# Patient Record
Sex: FEMALE | Race: WHITE | HISPANIC OR LATINO | Employment: OTHER | URBAN - METROPOLITAN AREA
[De-identification: names, ages, dates, MRNs, and addresses within clinical notes are randomized per-mention and may not be internally consistent; named-entity substitution may affect disease eponyms.]

---

## 2020-12-21 ENCOUNTER — OFFICE VISIT (OUTPATIENT)
Dept: OBSTETRICS AND GYNECOLOGY | Facility: CLINIC | Age: 65
End: 2020-12-21
Payer: MEDICARE

## 2020-12-21 VITALS
WEIGHT: 121 LBS | DIASTOLIC BLOOD PRESSURE: 78 MMHG | HEIGHT: 61 IN | SYSTOLIC BLOOD PRESSURE: 123 MMHG | BODY MASS INDEX: 22.84 KG/M2

## 2020-12-21 DIAGNOSIS — N95.2 ATROPHIC VAGINITIS: ICD-10-CM

## 2020-12-21 DIAGNOSIS — Z00.00 ENCOUNTER FOR WELLNESS EXAMINATION: Primary | ICD-10-CM

## 2020-12-21 PROCEDURE — G0101 CA SCREEN;PELVIC/BREAST EXAM: HCPCS | Mod: S$GLB,,, | Performed by: OBSTETRICS & GYNECOLOGY

## 2020-12-21 PROCEDURE — G0101 PR CA SCREEN;PELVIC/BREAST EXAM: ICD-10-PCS | Mod: S$GLB,,, | Performed by: OBSTETRICS & GYNECOLOGY

## 2020-12-21 RX ORDER — CYCLOSPORINE 0.5 MG/ML
EMULSION OPHTHALMIC
COMMUNITY
Start: 2020-12-11 | End: 2021-12-28

## 2020-12-21 RX ORDER — POLYETHYLENE GLYCOL 3350 17 G/17G
17 POWDER, FOR SOLUTION ORAL
COMMUNITY
End: 2021-12-28

## 2020-12-21 RX ORDER — TRAZODONE HYDROCHLORIDE 50 MG/1
50 TABLET ORAL NIGHTLY
COMMUNITY
Start: 2020-12-02

## 2020-12-21 RX ORDER — LEVOTHYROXINE SODIUM 25 UG/1
25 TABLET ORAL
COMMUNITY
End: 2021-12-28

## 2020-12-21 RX ORDER — CALCIUM CARBONATE 600 MG
1 TABLET ORAL 2 TIMES DAILY WITH MEALS
COMMUNITY

## 2020-12-21 RX ORDER — ROSUVASTATIN CALCIUM 10 MG/1
5 TABLET, COATED ORAL
COMMUNITY
End: 2023-01-31

## 2020-12-21 RX ORDER — AA/PROT/LYSINE/METHIO/VIT C/B6 50-12.5 MG
10 TABLET ORAL DAILY
COMMUNITY

## 2020-12-21 RX ORDER — ESCITALOPRAM OXALATE 5 MG/1
TABLET ORAL
COMMUNITY
Start: 2020-10-30 | End: 2021-12-28

## 2020-12-21 NOTE — PROGRESS NOTES
Subjective:       Patient ID: Christiane Harry is a 65 y.o. female.    Chief Complaint:  Well Woman      History of Present Illness  HPI  Annual Exam-Postmenopausal  Patient presents for annual exam. The patient has no complaints today. The patient is sexually active. GYN screening history: last pap: was normal. The patient is not taking hormone replacement therapy. Patient denies post-menopausal vaginal bleeding.       Aloe and e working for her vaginal dryness          GYN & OB History  No LMP recorded.   Date of Last Pap: No result found    OB History    Para Term  AB Living   2 1 1   1     SAB TAB Ectopic Multiple Live Births   1              # Outcome Date GA Lbr Danis/2nd Weight Sex Delivery Anes PTL Lv   2 SAB            1 Term      Vag-Spont          Review of Systems  Review of Systems   Constitutional: Negative.  Negative for activity change, appetite change, chills, fatigue, fever and unexpected weight change.   HENT: Negative for nasal congestion.    Eyes: Negative for visual disturbance.   Respiratory: Negative for cough, shortness of breath and wheezing.    Cardiovascular: Negative for chest pain, palpitations and leg swelling.   Gastrointestinal: Negative.  Negative for abdominal pain, bloating, blood in stool, constipation, diarrhea and reflux.   Endocrine: Negative.  Negative for diabetes, hair loss, hot flashes, hyperthyroidism and hypothyroidism.   Genitourinary: Negative.  Positive for vaginal dryness. Negative for bladder incontinence, decreased libido, dysmenorrhea, dyspareunia, dysuria, flank pain, frequency, genital sores, hematuria, hot flashes, menorrhagia, menstrual problem, pelvic pain, urgency, vaginal bleeding, vaginal discharge, vaginal pain, urinary incontinence, postcoital bleeding, postmenopausal bleeding and vaginal odor.   Musculoskeletal: Negative for back pain, joint swelling and myalgias.   Integumentary:  Negative for rash, acne, hair changes, mole/lesion,  breast mass, nipple discharge, breast skin changes and breast tenderness. Negative.   Neurological: Negative.  Negative for vertigo, seizures, syncope, numbness and headaches.   Hematological: Negative.  Negative for adenopathy. Does not bruise/bleed easily.   Psychiatric/Behavioral: Negative.  Negative for depression and sleep disturbance. The patient is not nervous/anxious.    Breast: negative.  Negative for asymmetry, breast self exam, lump, mass, mastodynia, nipple discharge, skin changes and tenderness          Objective:    Physical Exam:   Constitutional: She appears well-developed and well-nourished.    HENT:   Nose: No epistaxis.     Neck: No thyroid mass and no thyromegaly present.    Cardiovascular: Normal rate, regular rhythm and normal pulses.     Pulmonary/Chest: Effort normal and breath sounds normal. Chest wall is not dull to percussion. She exhibits no mass, no tenderness, no bony tenderness, no laceration, no crepitus, no edema, no deformity, no swelling and no retraction. Right breast exhibits no inverted nipple, no mass, no nipple discharge, no skin change, no tenderness, presence, no bleeding and no swelling. Left breast exhibits no inverted nipple, no mass, no nipple discharge, no skin change, no tenderness, presence, no bleeding and no swelling.        Abdominal: Soft. Normal appearance and bowel sounds are normal. There is no abdominal tenderness. Hernia confirmed negative in the right inguinal area and confirmed negative in the left inguinal area.     Genitourinary:    Vagina and uterus normal.      Pelvic exam was performed with patient supine.   There is no rash, tenderness, lesion or injury on the right labia. There is no rash, tenderness, lesion or injury on the left labia. Cervix is normal. Right adnexum displays no mass, no tenderness and no fullness. Left adnexum displays no mass, no tenderness and no fullness. No rectocele, cystocele or unspecified prolapse of vaginal walls in the  vagina. Labial bartholins normal.   Genitourinary Comments: Atrophic vaginitis                   Skin: Skin is warm and dry.    Psychiatric: She has a normal mood and affect. Her speech is normal and behavior is normal.          Assessment:        1. Encounter for wellness examination    2. Atrophic vaginitis       Encounter for wellness examination    Atrophic vaginitis             Plan:      Follow up in about 1 year (around 12/21/2021).

## 2021-11-16 ENCOUNTER — TELEPHONE (OUTPATIENT)
Dept: OBSTETRICS AND GYNECOLOGY | Facility: CLINIC | Age: 66
End: 2021-11-16
Payer: MEDICARE

## 2021-11-17 ENCOUNTER — TELEPHONE (OUTPATIENT)
Dept: OBSTETRICS AND GYNECOLOGY | Facility: CLINIC | Age: 66
End: 2021-11-17
Payer: MEDICARE

## 2021-12-28 ENCOUNTER — OFFICE VISIT (OUTPATIENT)
Dept: OBSTETRICS AND GYNECOLOGY | Facility: CLINIC | Age: 66
End: 2021-12-28
Payer: MEDICARE

## 2021-12-28 DIAGNOSIS — Z00.00 ENCOUNTER FOR WELLNESS EXAMINATION: Primary | ICD-10-CM

## 2021-12-28 DIAGNOSIS — N95.2 ATROPHIC VAGINITIS: ICD-10-CM

## 2021-12-28 DIAGNOSIS — Z85.3 PERSONAL HISTORY OF BREAST CANCER: ICD-10-CM

## 2021-12-28 PROCEDURE — G0101 PR CA SCREEN;PELVIC/BREAST EXAM: ICD-10-PCS | Mod: S$GLB,,, | Performed by: OBSTETRICS & GYNECOLOGY

## 2021-12-28 PROCEDURE — G0101 CA SCREEN;PELVIC/BREAST EXAM: HCPCS | Mod: S$GLB,,, | Performed by: OBSTETRICS & GYNECOLOGY

## 2021-12-28 RX ORDER — OMEPRAZOLE 20 MG/1
CAPSULE, DELAYED RELEASE ORAL
COMMUNITY
End: 2023-10-25 | Stop reason: SDUPTHER

## 2021-12-28 RX ORDER — ESCITALOPRAM OXALATE 10 MG/1
10 TABLET ORAL DAILY
COMMUNITY
Start: 2021-11-21 | End: 2021-12-28

## 2022-02-22 ENCOUNTER — TELEPHONE (OUTPATIENT)
Dept: OBSTETRICS AND GYNECOLOGY | Facility: CLINIC | Age: 67
End: 2022-02-22
Payer: MEDICARE

## 2022-02-22 RX ORDER — LEVOTHYROXINE SODIUM 25 UG/1
25 TABLET ORAL DAILY
COMMUNITY
Start: 2022-02-15

## 2022-02-22 RX ORDER — CYCLOSPORINE 0.5 MG/ML
EMULSION OPHTHALMIC
COMMUNITY
Start: 2022-01-10 | End: 2024-02-07

## 2022-02-22 NOTE — TELEPHONE ENCOUNTER
My preference list is not cooperating to pull up the medication requested, pharmacy added to be able to send out requested medication.     ----- Message from Hailee Valderrama sent at 2/22/2022  8:49 AM CST -----  Contact: Patient  .Type:  RX Refill Request    Who Called:  Patient  Refill or New Rx:  Refill  RX Name and Strength: Aloe vaginal cream ??  How is the patient currently taking it? (ex. 1XDay):  Is this a 30 day or 90 day RX:  Preferred Pharmacy with phone number:.    [facility] [phone]    Local or Mail Order: Local  Ordering Provider: Dr Liz  Would the patient rather a call back or a response via [facility]? Call  Best Call Back Number: .[phone] (home)     Additional Information:

## 2022-02-22 NOTE — TELEPHONE ENCOUNTER
Called [facility] to refill patients previous Rx of Aloe And E vaginal cream with 2 refills. Verbal read back from [employer]t.  Tahmina Austin

## 2022-04-18 ENCOUNTER — OFFICE VISIT (OUTPATIENT)
Dept: GASTROENTEROLOGY | Facility: CLINIC | Age: 67
End: 2022-04-18
Payer: MEDICARE

## 2022-04-18 VITALS
SYSTOLIC BLOOD PRESSURE: 121 MMHG | OXYGEN SATURATION: 98 % | HEIGHT: 62 IN | HEART RATE: 64 BPM | WEIGHT: 126.63 LBS | DIASTOLIC BLOOD PRESSURE: 78 MMHG | BODY MASS INDEX: 23.3 KG/M2

## 2022-04-18 DIAGNOSIS — K21.9 GASTROESOPHAGEAL REFLUX DISEASE, UNSPECIFIED WHETHER ESOPHAGITIS PRESENT: ICD-10-CM

## 2022-04-18 DIAGNOSIS — K76.89 HEPATIC CYST: ICD-10-CM

## 2022-04-18 DIAGNOSIS — Z86.010 PERSONAL HISTORY OF COLONIC POLYPS: ICD-10-CM

## 2022-04-18 DIAGNOSIS — Z12.11 SCREENING FOR COLON CANCER: ICD-10-CM

## 2022-04-18 DIAGNOSIS — K31.7 GASTRIC POLYPS: Primary | ICD-10-CM

## 2022-04-18 PROCEDURE — 99214 OFFICE O/P EST MOD 30 MIN: CPT | Mod: S$GLB,,, | Performed by: INTERNAL MEDICINE

## 2022-04-18 PROCEDURE — 99214 PR OFFICE/OUTPT VISIT, EST, LEVL IV, 30-39 MIN: ICD-10-PCS | Mod: S$GLB,,, | Performed by: INTERNAL MEDICINE

## 2022-04-18 RX ORDER — ESCITALOPRAM OXALATE 5 MG/1
5 TABLET ORAL DAILY
COMMUNITY

## 2022-04-18 RX ORDER — FLUTICASONE PROPIONATE 50 MCG
1 SPRAY, SUSPENSION (ML) NASAL DAILY PRN
COMMUNITY
End: 2024-02-07

## 2022-04-18 RX ORDER — AMOXICILLIN 500 MG
1 CAPSULE ORAL DAILY
COMMUNITY

## 2022-04-18 NOTE — PROGRESS NOTES
Clinic Note    Reason for visit:  The primary encounter diagnosis was Gastric polyps. Diagnoses of Personal history of colonic polyps, Screening for colon cancer, Hepatic cyst, and Gastroesophageal reflux disease, unspecified whether esophagitis present were also pertinent to this visit.    PCP: Jose Sim   [address] [facility] [address] / [place] [address]    HPI:  This is a 67 y.o. female who is an established patient who is being seen for a follow up visit. She reports feeling well. Denies abdominal pain, CP, SOB, or weight loss. She has h/o hepatic cysts. She takes omeprazole 20 mg for reflux which controls her symptoms, also has h/o gastric polyps. Tested negative for Alzheimer's protein.     Outside records reviewed:  Last EGD 6/1/2021: benign gastric polyps-repeat EGD in 2 yrs.  Last US 5/5/2021: U/S shows the cysts-one is now much smaller and one is only minimally bigger-all the cysts appear benign with no concerning features. Repeat U/S in one yr  Last colonoscopy 1/24/2017: due 1/2022, h/o colon polyps    Review of Systems   Constitutional: Negative for chills, diaphoresis, fatigue, fever and unexpected weight change.   HENT: Negative for mouth sores, nosebleeds, postnasal drip, sore throat, trouble swallowing and voice change.    Eyes: Negative for pain, discharge and eye dryness.   Respiratory: Negative for apnea, cough, choking, chest tightness, shortness of breath and wheezing.    Cardiovascular: Negative for chest pain, palpitations, leg swelling and claudication.   Gastrointestinal: Negative for abdominal distention, abdominal pain, anal bleeding, blood in stool, change in bowel habit, constipation, diarrhea, nausea, rectal pain, vomiting, reflux, fecal incontinence and change in bowel habit.   Genitourinary: Negative for bladder incontinence, difficulty urinating, dysuria, flank pain, frequency and hematuria.   Musculoskeletal: Negative for arthralgias, back pain, joint swelling and joint deformity.    Integumentary:  Negative for color change, rash and wound.   Allergic/Immunologic: Negative for environmental allergies and food allergies.   Neurological: Negative for seizures, facial asymmetry, speech difficulty, weakness, headaches and memory loss.   Hematological: Negative for adenopathy. Does not bruise/bleed easily.   Psychiatric/Behavioral: Negative for agitation, behavioral problems, confusion, hallucinations and sleep disturbance.      Past Medical History:   Diagnosis Date    Atrophic vaginitis     Breast cancer     left    Osteopenia     Polyp of stomach      Past Surgical History:   Procedure Laterality Date    BREAST LUMPECTOMY Left     COLONOSCOPY      ESOPHAGOGASTRODUODENOSCOPY  06/01/2021    repeat EGD due 6/1/2023    INTRAOCULAR PROSTHESES INSERTION      SURGICAL REMOVAL OF LYMPH NODE       Family History   Problem Relation Age of Onset    Ovarian cancer Mother     Colon cancer Neg Hx     Inflammatory bowel disease Neg Hx     Liver cancer Neg Hx     Stomach cancer Neg Hx     Rectal cancer Neg Hx      Social History     Tobacco Use    Smoking status: Never Smoker    Smokeless tobacco: Never Used   Substance Use Topics    Alcohol use: Not Currently    Drug use: Never     Review of patient's allergies indicates:  No Known Allergies   Medication List with Changes/Refills   Current Medications    CALCIUM CARBONATE (OS-GUILLE) 600 MG CALCIUM (1,500 MG) TAB    Take 1 tablet by mouth.    CALCIUM-MINERALS-D3-K2-SILICON 200 MG CALCIUM- 200 UNIT TAB    Take 600 mg by mouth.    COENZYME Q10 10 MG CAPSULE    Co Q-10   100mg q d    ESCITALOPRAM OXALATE (LEXAPRO) 5 MG TAB    Take 5 mg by mouth once daily.    FLUTICASONE PROPIONATE (FLONASE) 50 MCG/ACTUATION NASAL SPRAY    1 spray by Each Nostril route daily as needed for Rhinitis.    LEVOTHYROXINE (SYNTHROID) 25 MCG TABLET    Take 25 mcg by mouth once daily.    OMEGA-3 FATTY ACIDS/FISH OIL (FISH OIL-OMEGA-3 FATTY ACIDS) 300-1,000 MG CAPSULE     "Take 1 capsule by mouth once daily.    OMEPRAZOLE (PRILOSEC) 20 MG CAPSULE    omeprazole 20 mg capsule,delayed release   Take 1 capsule every day by oral route as needed.    RESTASIS 0.05 % OPHTHALMIC EMULSION    INSTILL ONE DROP INTO EACH EYE TWICE DAILY, APPROXIMATELY TWELVE HOURS APART, FOR DRY EYE    ROSUVASTATIN (CRESTOR) 10 MG TABLET    Take 5 mg by mouth.    TRAZODONE (DESYREL) 50 MG TABLET        VITAMIN K2 ORAL    Take 50 mcg by mouth.    ZINC 50 MG TAB    Take 50 mg by mouth once daily.         Vital Signs:  /78 (BP Location: Right arm, Patient Position: Sitting, BP Method: Medium (Automatic))   Pulse 64   Ht 5' 1.5" (1.562 m)   Wt 57.4 kg (126 lb 9.6 oz)   SpO2 98%   BMI 23.53 kg/m²   Body mass index is 23.53 kg/m².      Physical Exam  Vitals reviewed.   Constitutional:       General: She is awake. She is not in acute distress.     Appearance: Normal appearance. She is well-developed. She is not ill-appearing, toxic-appearing or diaphoretic.   HENT:      Head: Normocephalic and atraumatic.      Nose: Nose normal.      Mouth/Throat:      Mouth: Mucous membranes are moist.      Pharynx: Oropharynx is clear. No oropharyngeal exudate or posterior oropharyngeal erythema.   Eyes:      General: Lids are normal. Gaze aligned appropriately. No scleral icterus.        Right eye: No discharge.         Left eye: No discharge.      Extraocular Movements: Extraocular movements intact.      Conjunctiva/sclera: Conjunctivae normal.   Neck:      Trachea: Trachea normal.   Cardiovascular:      Rate and Rhythm: Normal rate and regular rhythm.      Pulses:           Radial pulses are 2+ on the right side and 2+ on the left side.   Pulmonary:      Effort: Pulmonary effort is normal. No respiratory distress.      Breath sounds: Normal breath sounds. No stridor. No wheezing or rhonchi.   Chest:      Chest wall: No tenderness.   Abdominal:      General: Abdomen is flat. Bowel sounds are normal. There is no distension. "      Palpations: Abdomen is soft. There is no fluid wave, hepatomegaly or mass.      Tenderness: There is no abdominal tenderness. There is no guarding or rebound.   Musculoskeletal:         General: No tenderness or deformity.      Cervical back: Full passive range of motion without pain and neck supple. No tenderness.      Right lower leg: No edema.      Left lower leg: No edema.   Lymphadenopathy:      Cervical: No cervical adenopathy.   Skin:     General: Skin is warm and dry.      Capillary Refill: Capillary refill takes less than 2 seconds.      Coloration: Skin is not cyanotic, jaundiced or pale.      Findings: No rash.   Neurological:      General: No focal deficit present.      Mental Status: She is alert and oriented to person, place, and time.      Cranial Nerves: No facial asymmetry.      Motor: No tremor.   Psychiatric:         Attention and Perception: Attention normal.         Mood and Affect: Mood and affect normal.         Speech: Speech normal.         Behavior: Behavior normal. Behavior is cooperative.          All of the data above and below has been reviewed by myself and any further interpretations will be reflected in the assessment and plan.   The data includes review of external notes, and independent interpretation of lab results, procedures, x-rays, and imaging reports.      Assessment:  Gastric polyps    Personal history of colonic polyps  -     Ambulatory Referral to External Surgery    Screening for colon cancer  -     Ambulatory Referral to External Surgery    Hepatic cyst  -     US Abdomen Complete; Future; Expected date: 04/18/2022    Gastroesophageal reflux disease, unspecified whether esophagitis present         Recommendations:  Schedule colonoscopy at [facility] with sutab. Sample of sutab given.  Get abdominal US to monitor hepatic cysts.     Risks, benefits, and alternatives of medical management, any associated procedures, and/or treatment discussed with the patient. Patient given  opportunity to ask questions and voices understanding. Patient has elected to proceed with the recommended care modalities as discussed.    Follow up in about 6 months (around 10/18/2022).    Order summary:  Orders Placed This Encounter   Procedures    US Abdomen Complete    Ambulatory Referral to External Surgery          Dwight Waldron MD    This document may have been created using a voice recognition transcribing system. Incorrect words or phrases may have been missed during proofreading. Please interpret accordingly or contact me for clarification.

## 2022-05-02 ENCOUNTER — TELEPHONE (OUTPATIENT)
Dept: GASTROENTEROLOGY | Facility: CLINIC | Age: 67
End: 2022-05-02
Payer: MEDICARE

## 2022-05-02 NOTE — TELEPHONE ENCOUNTER
----- Message from Maral Fitzpatrick sent at 5/2/2022 10:57 AM CDT -----  pt needs to know if she can take her levothyroxine tomorrow morning as usual and crestor tomorrow night (usually takes in morning)..[phone] (home)

## 2022-05-03 ENCOUNTER — OUTSIDE PLACE OF SERVICE (OUTPATIENT)
Dept: GASTROENTEROLOGY | Facility: CLINIC | Age: 67
End: 2022-05-03
Payer: MEDICARE

## 2022-05-03 PROCEDURE — G0105 COLORECTAL SCRN; HI RISK IND: ICD-10-PCS | Mod: ,,, | Performed by: INTERNAL MEDICINE

## 2022-05-03 PROCEDURE — G0105 COLORECTAL SCRN; HI RISK IND: HCPCS | Mod: ,,, | Performed by: INTERNAL MEDICINE

## 2022-05-11 ENCOUNTER — TELEPHONE (OUTPATIENT)
Dept: GASTROENTEROLOGY | Facility: CLINIC | Age: 67
End: 2022-05-11
Payer: MEDICARE

## 2022-05-11 NOTE — TELEPHONE ENCOUNTER
----- Message from Maral Fitzpatrick sent at 5/11/2022  9:35 AM CDT -----  .Type:  Patient Returning Call    Who Called:SELF  Who Left Message for Patient: N/A  Does the patient know what this is regarding?: RESULTS  Would the patient rather a call back or a response via [facility]? CALL  Best Call Back Number:.[phone]

## 2022-10-19 ENCOUNTER — OFFICE VISIT (OUTPATIENT)
Dept: GASTROENTEROLOGY | Facility: CLINIC | Age: 67
End: 2022-10-19
Payer: MEDICARE

## 2022-10-19 VITALS
HEART RATE: 64 BPM | SYSTOLIC BLOOD PRESSURE: 121 MMHG | DIASTOLIC BLOOD PRESSURE: 78 MMHG | WEIGHT: 127 LBS | OXYGEN SATURATION: 86 % | HEIGHT: 61 IN | BODY MASS INDEX: 23.98 KG/M2

## 2022-10-19 DIAGNOSIS — Z86.010 PERSONAL HISTORY OF COLONIC POLYPS: ICD-10-CM

## 2022-10-19 DIAGNOSIS — K76.89 HEPATIC CYST: ICD-10-CM

## 2022-10-19 DIAGNOSIS — K21.9 GASTROESOPHAGEAL REFLUX DISEASE, UNSPECIFIED WHETHER ESOPHAGITIS PRESENT: Primary | ICD-10-CM

## 2022-10-19 PROCEDURE — 99213 OFFICE O/P EST LOW 20 MIN: CPT | Mod: S$GLB,,, | Performed by: INTERNAL MEDICINE

## 2022-10-19 PROCEDURE — 99213 PR OFFICE/OUTPT VISIT, EST, LEVL III, 20-29 MIN: ICD-10-PCS | Mod: S$GLB,,, | Performed by: INTERNAL MEDICINE

## 2022-10-19 NOTE — PROGRESS NOTES
Clinic Note    Reason for visit:  The primary encounter diagnosis was Gastroesophageal reflux disease, unspecified whether esophagitis present. Diagnoses of Hepatic cyst and Personal history of colonic polyps were also pertinent to this visit.    PCP: Jose Sim       HPI:  This is a 67 y.o. female who is being seen for a follow up. She has h/o hepatic cysts. She takes omeprazole 20 mg for reflux which controls her symptoms, also has h/o gastric polyps. Tested negative for Alzheimer's protein. She reports doing well.     US 5/10/2022: U/S shows no change in cysts in liver-gallbladder ok, small stones in right kidney-take in plenty of fluids  Colonoscopy 5/3/2022: diverticulosis of sigmoid, long redundant colon. Repeat in 5 yr  Last EGD 6/1/2021: benign gastric polyps-repeat EGD in 2 yrs.  US 5/5/2021: U/S shows the cysts-one is now much smaller and one is only minimally bigger-all the cysts appear benign with no concerning features.     Review of Systems   Constitutional:  Negative for chills, diaphoresis, fatigue, fever and unexpected weight change.   HENT:  Negative for mouth sores, nosebleeds, postnasal drip, sore throat, trouble swallowing and voice change.    Eyes:  Negative for pain, discharge and eye dryness.   Respiratory:  Negative for apnea, cough, choking, chest tightness, shortness of breath and wheezing.    Cardiovascular:  Negative for chest pain, palpitations, leg swelling and claudication.   Gastrointestinal:  Negative for abdominal distention, abdominal pain, anal bleeding, blood in stool, change in bowel habit, constipation, diarrhea, nausea, rectal pain, vomiting, reflux, fecal incontinence and change in bowel habit.   Genitourinary:  Negative for bladder incontinence, difficulty urinating, dysuria, flank pain, frequency and hematuria.   Musculoskeletal:  Negative for arthralgias, back pain, joint swelling and joint deformity.   Integumentary:  Negative for color change, rash and wound.    Allergic/Immunologic: Negative for environmental allergies and food allergies.   Neurological:  Negative for seizures, facial asymmetry, speech difficulty, weakness, headaches and memory loss.   Hematological:  Negative for adenopathy. Does not bruise/bleed easily.   Psychiatric/Behavioral:  Negative for agitation, behavioral problems, confusion, hallucinations and sleep disturbance.       Past Medical History:   Diagnosis Date    Atrophic vaginitis     Breast cancer     left    Dyslipidemia     Hypothyroidism, unspecified     Osteopenia     Polyp of stomach      Past Surgical History:   Procedure Laterality Date    BREAST LUMPECTOMY Left     COLONOSCOPY      ESOPHAGOGASTRODUODENOSCOPY  06/01/2021    repeat EGD due 6/1/2023    INTRAOCULAR PROSTHESES INSERTION      SURGICAL REMOVAL OF LYMPH NODE       Family History   Problem Relation Age of Onset    Ovarian cancer Mother     Colon cancer Neg Hx     Inflammatory bowel disease Neg Hx     Liver cancer Neg Hx     Stomach cancer Neg Hx     Rectal cancer Neg Hx      Social History     Tobacco Use    Smoking status: Never    Smokeless tobacco: Never   Substance Use Topics    Alcohol use: Yes     Alcohol/week: 1.0 standard drink     Types: 1 Glasses of wine per week    Drug use: Never     Review of patient's allergies indicates:  No Known Allergies     Medication List with Changes/Refills   Current Medications    CALCIUM CARBONATE (OS-GUILLE) 600 MG CALCIUM (1,500 MG) TAB    Take 1 tablet by mouth.    CALCIUM-MINERALS-D3-K2-SILICON 200 MG CALCIUM- 200 UNIT TAB    Take 600 mg by mouth.    COENZYME Q10 10 MG CAPSULE    Co Q-10   100mg q d    ESCITALOPRAM OXALATE (LEXAPRO) 5 MG TAB    Take 5 mg by mouth once daily.    FLUTICASONE PROPIONATE (FLONASE) 50 MCG/ACTUATION NASAL SPRAY    1 spray by Each Nostril route daily as needed for Rhinitis.    LEVOTHYROXINE (SYNTHROID) 25 MCG TABLET    Take 25 mcg by mouth once daily.    OMEGA-3 FATTY ACIDS/FISH OIL (FISH OIL-OMEGA-3 FATTY  "ACIDS) 300-1,000 MG CAPSULE    Take 1 capsule by mouth once daily.    OMEPRAZOLE (PRILOSEC) 20 MG CAPSULE    omeprazole 20 mg capsule,delayed release   Take 1 capsule every day by oral route as needed.    RESTASIS 0.05 % OPHTHALMIC EMULSION    INSTILL ONE DROP INTO EACH EYE TWICE DAILY, APPROXIMATELY TWELVE HOURS APART, FOR DRY EYE    ROSUVASTATIN (CRESTOR) 10 MG TABLET    Take 5 mg by mouth.    TRAZODONE (DESYREL) 50 MG TABLET        VITAMIN K2 ORAL    Take 50 mcg by mouth.    ZINC 50 MG TAB    Take 50 mg by mouth once daily.         Vital Signs:  /78 (BP Location: Right arm, Patient Position: Sitting, BP Method: Medium (Automatic))   Pulse 64   Ht 5' 1" (1.549 m)   Wt 57.6 kg (127 lb)   SpO2 (!) 86%   BMI 24.00 kg/m²         Physical Exam  Vitals reviewed.   Constitutional:       General: She is awake. She is not in acute distress.     Appearance: Normal appearance. She is well-developed. She is not ill-appearing, toxic-appearing or diaphoretic.   HENT:      Head: Normocephalic and atraumatic.      Nose: Nose normal.      Mouth/Throat:      Mouth: Mucous membranes are moist.      Pharynx: Oropharynx is clear. No oropharyngeal exudate or posterior oropharyngeal erythema.   Eyes:      General: Lids are normal. Gaze aligned appropriately. No scleral icterus.        Right eye: No discharge.         Left eye: No discharge.      Extraocular Movements: Extraocular movements intact.      Conjunctiva/sclera: Conjunctivae normal.   Neck:      Trachea: Trachea normal.   Cardiovascular:      Rate and Rhythm: Normal rate and regular rhythm.      Pulses:           Radial pulses are 2+ on the right side and 2+ on the left side.   Pulmonary:      Effort: Pulmonary effort is normal. No respiratory distress.      Breath sounds: Normal breath sounds. No stridor. No wheezing or rhonchi.   Chest:      Chest wall: No tenderness.   Abdominal:      General: Bowel sounds are normal. There is no distension.      Palpations: " Abdomen is soft. There is no fluid wave, hepatomegaly or mass.      Tenderness: There is no abdominal tenderness. There is no guarding or rebound.   Musculoskeletal:         General: No tenderness or deformity.      Cervical back: Full passive range of motion without pain and neck supple. No tenderness.      Right lower leg: No edema.      Left lower leg: No edema.   Lymphadenopathy:      Cervical: No cervical adenopathy.   Skin:     General: Skin is warm and dry.      Capillary Refill: Capillary refill takes less than 2 seconds.      Coloration: Skin is not cyanotic, jaundiced or pale.      Findings: No rash.   Neurological:      General: No focal deficit present.      Mental Status: She is alert and oriented to person, place, and time.      Cranial Nerves: No facial asymmetry.      Motor: No tremor.   Psychiatric:         Attention and Perception: Attention normal.         Mood and Affect: Mood and affect normal.         Speech: Speech normal.         Behavior: Behavior normal. Behavior is cooperative.          All of the data above and below has been reviewed by myself and any further interpretations will be reflected in the assessment and plan.   The data includes review of external notes, and independent interpretation of lab results, procedures, x-rays, and imaging reports.      Assessment:  Gastroesophageal reflux disease, unspecified whether esophagitis present    Hepatic cyst    Personal history of colonic polyps       Recommendations:  Continue omeprazole 20 mg daily.     Risks, benefits, and alternatives of medical management, any associated procedures, and/or treatment discussed with the patient. Patient given opportunity to ask questions and voices understanding. Patient has elected to proceed with the recommended care modalities as discussed.    Follow up in about 6 months (around 4/19/2023).    Order summary:  No orders of the defined types were placed in this encounter.       Instructed patient to  notify my office if they have not been contacted within two weeks after any procedures, submitting any samples (biopsies, blood, stool, urine, etc.) or after any imaging (X-ray, CT, MRI, etc.).     Dwight Waldron MD    This document may have been created using a voice recognition transcribing system. Incorrect words or phrases may have been missed during proofreading. Please interpret accordingly or contact me for clarification.

## 2022-11-09 ENCOUNTER — PATIENT MESSAGE (OUTPATIENT)
Dept: OBSTETRICS AND GYNECOLOGY | Facility: CLINIC | Age: 67
End: 2022-11-09
Payer: MEDICARE

## 2022-12-08 RX ORDER — ALOE VERA
POWDER (GRAM) MISCELLANEOUS
Qty: 60 EACH | Refills: 0 | Status: SHIPPED | OUTPATIENT
Start: 2022-12-08 | End: 2023-04-11 | Stop reason: SDUPTHER

## 2023-01-31 ENCOUNTER — OFFICE VISIT (OUTPATIENT)
Dept: OBSTETRICS AND GYNECOLOGY | Facility: CLINIC | Age: 68
End: 2023-01-31
Payer: MEDICARE

## 2023-01-31 VITALS
BODY MASS INDEX: 24.62 KG/M2 | HEIGHT: 61 IN | HEART RATE: 60 BPM | SYSTOLIC BLOOD PRESSURE: 134 MMHG | WEIGHT: 130.38 LBS | DIASTOLIC BLOOD PRESSURE: 85 MMHG

## 2023-01-31 DIAGNOSIS — Z12.31 ENCOUNTER FOR SCREENING MAMMOGRAM FOR MALIGNANT NEOPLASM OF BREAST: ICD-10-CM

## 2023-01-31 DIAGNOSIS — N95.1 MENOPAUSE SYNDROME: Primary | ICD-10-CM

## 2023-01-31 PROBLEM — K76.89 LIVER CYST: Status: ACTIVE | Noted: 2017-03-10

## 2023-01-31 PROBLEM — G47.00 INSOMNIA: Status: ACTIVE | Noted: 2023-01-31

## 2023-01-31 PROBLEM — C50.919 BREAST CANCER: Status: ACTIVE | Noted: 2017-03-10

## 2023-01-31 PROBLEM — Z85.3 PERSONAL HISTORY OF BREAST CANCER: Status: ACTIVE | Noted: 2017-03-10

## 2023-01-31 PROBLEM — N39.0 URINARY TRACT INFECTIOUS DISEASE: Status: ACTIVE | Noted: 2023-01-31

## 2023-01-31 PROBLEM — J01.90 ACUTE SINUSITIS: Status: ACTIVE | Noted: 2023-01-31

## 2023-01-31 PROBLEM — D72.9 ABNORMAL WHITE BLOOD CELL (WBC) COUNT: Status: ACTIVE | Noted: 2023-01-31

## 2023-01-31 PROBLEM — M81.0 OSTEOPOROSIS: Status: ACTIVE | Noted: 2023-01-31

## 2023-01-31 PROBLEM — R10.13 EPIGASTRIC PAIN: Status: ACTIVE | Noted: 2017-03-10

## 2023-01-31 PROBLEM — K59.00 CONSTIPATION: Status: ACTIVE | Noted: 2023-01-31

## 2023-01-31 PROBLEM — K21.9 GASTROESOPHAGEAL REFLUX DISEASE: Status: ACTIVE | Noted: 2023-01-31

## 2023-01-31 PROBLEM — J31.0 CHRONIC RHINITIS: Status: ACTIVE | Noted: 2023-01-31

## 2023-01-31 PROBLEM — F41.1 GENERALIZED ANXIETY DISORDER: Status: ACTIVE | Noted: 2023-01-31

## 2023-01-31 PROBLEM — E78.5 HYPERLIPIDEMIA: Status: ACTIVE | Noted: 2023-01-31

## 2023-01-31 PROCEDURE — 99213 PR OFFICE/OUTPT VISIT, EST, LEVL III, 20-29 MIN: ICD-10-PCS | Mod: S$GLB,,, | Performed by: NURSE PRACTITIONER

## 2023-01-31 PROCEDURE — 99213 OFFICE O/P EST LOW 20 MIN: CPT | Mod: S$GLB,,, | Performed by: NURSE PRACTITIONER

## 2023-01-31 RX ORDER — FLUOCINONIDE TOPICAL SOLUTION USP, 0.05% 0.5 MG/ML
SOLUTION TOPICAL
COMMUNITY
Start: 2022-12-20 | End: 2024-02-07

## 2023-01-31 RX ORDER — ROSUVASTATIN CALCIUM 5 MG/1
5 TABLET, COATED ORAL DAILY
COMMUNITY
Start: 2022-11-05

## 2023-01-31 NOTE — PROGRESS NOTES
"  Subjective:       Patient ID: Christiane Harry is a 68 y.o. female.    Chief Complaint:  menopause     History of Present Illness  HPI  Medicare problem visit : reports chronic vaginal dryness, doing well otherwise. Needs mammogram    Outpatient Medications Marked as Taking for the 1/31/23 encounter (Office Visit) with Dee Millan NP   Medication Sig Dispense Refill    aloe vera, bulk, Powd Apply 2-4 clicks externally as needed 60 each 0    calcium carbonate (OS-GUILLE) 600 mg calcium (1,500 mg) Tab Take 1 tablet by mouth.      calcium-minerals-D3-K2-silicon 200 mg calcium- 200 unit Tab Take 600 mg by mouth.      coenzyme Q10 10 mg capsule Co Q-10   100mg q d      EScitalopram oxalate (LEXAPRO) 5 MG Tab Take 5 mg by mouth once daily.      fluticasone propionate (FLONASE) 50 mcg/actuation nasal spray 1 spray by Each Nostril route daily as needed for Rhinitis.      levothyroxine (SYNTHROID) 25 MCG tablet Take 25 mcg by mouth once daily.      omega-3 fatty acids/fish oil (FISH OIL-OMEGA-3 FATTY ACIDS) 300-1,000 mg capsule Take 1 capsule by mouth once daily.      omeprazole (PRILOSEC) 20 MG capsule omeprazole 20 mg capsule,delayed release   Take 1 capsule every day by oral route as needed.      RESTASIS 0.05 % ophthalmic emulsion INSTILL ONE DROP INTO EACH EYE TWICE DAILY, APPROXIMATELY TWELVE HOURS APART, FOR DRY EYE      rosuvastatin (CRESTOR) 5 MG tablet Take 5 mg by mouth.      traZODone (DESYREL) 50 MG tablet       VITAMIN K2 ORAL Take 50 mcg by mouth.      zinc 50 mg Tab Take 50 mg by mouth once daily.       Vitals:    01/31/23 1357   BP: 134/85   Pulse: 60   Weight: 59.1 kg (130 lb 6.4 oz)   Height: 5' 1" (1.549 m)     Past Medical History:   Diagnosis Date    Atrophic vaginitis     Breast cancer     left    Dyslipidemia     Hypothyroidism, unspecified     Osteopenia     Polyp of stomach      Past Surgical History:   Procedure Laterality Date    BREAST LUMPECTOMY Left     COLONOSCOPY      " ESOPHAGOGASTRODUODENOSCOPY  2021    repeat EGD due 2023    INTRAOCULAR PROSTHESES INSERTION      SURGICAL REMOVAL OF LYMPH NODE           GYN & OB History  No LMP recorded. Patient is postmenopausal.   Date of Last Pap: No result found    OB History    Para Term  AB Living   2 1 1   1     SAB IAB Ectopic Multiple Live Births   1              # Outcome Date GA Lbr Danis/2nd Weight Sex Delivery Anes PTL Lv   2 SAB            1 Term      Vag-Spont          Review of Systems  Review of Systems   Constitutional:  Negative for activity change, appetite change, chills, fatigue and fever.   HENT:  Negative for nasal congestion and tinnitus.    Eyes:  Negative for visual disturbance.   Respiratory:  Negative for cough and shortness of breath.    Cardiovascular:  Negative for chest pain and palpitations.   Gastrointestinal:  Negative for abdominal pain, bloating, blood in stool, constipation, nausea and vomiting.   Endocrine: Negative for diabetes, hair loss and hot flashes.   Genitourinary:  Negative for bladder incontinence, decreased libido, dysmenorrhea, dyspareunia, dysuria, flank pain, frequency, genital sores, hematuria, hot flashes, menorrhagia, menstrual problem, pelvic pain, urgency, vaginal bleeding, vaginal discharge, vaginal pain, urinary incontinence, postcoital bleeding, postmenopausal bleeding, vaginal dryness and vaginal odor.   Musculoskeletal:  Negative for arthralgias, back pain, leg pain and myalgias.   Integumentary:  Negative for rash, acne, hair changes, mole/lesion, breast mass, nipple discharge, breast skin changes and breast tenderness.   Neurological:  Negative for vertigo, syncope, numbness and headaches.   Hematological:  Does not bruise/bleed easily.   Psychiatric/Behavioral:  Negative for depression and sleep disturbance. The patient is not nervous/anxious.    Breast: Negative for asymmetry, lump, mass, mastodynia, nipple discharge, skin changes and tenderness         Objective:    Physical Exam:   Constitutional: She appears well-developed and well-nourished.    HENT:   Nose: No epistaxis.     Neck: No thyroid mass and no thyromegaly present.     Pulmonary/Chest: Effort normal and breath sounds normal. Chest wall is not dull to percussion. She exhibits no mass, no tenderness, no bony tenderness, no laceration, no crepitus, no edema, no deformity, no swelling and no retraction. Right breast exhibits no inverted nipple, no mass, no nipple discharge, no skin change, no tenderness, presence, no bleeding and no swelling. Left breast exhibits no inverted nipple, no mass, no nipple discharge, no skin change, no tenderness, presence, no bleeding and no swelling. Breasts are symmetrical.        Abdominal: Soft. Bowel sounds are normal. She exhibits no distension. There is no abdominal tenderness. Hernia confirmed negative in the right inguinal area and confirmed negative in the left inguinal area.     Genitourinary:    Vagina and rectum normal.      Pelvic exam was performed with patient supine.   Labial bartholins normal.There is no rash, tenderness, lesion or injury on the right labia. There is no rash, tenderness, lesion or injury on the left labia. Right adnexum displays no mass, no tenderness and no fullness. Left adnexum displays no mass, no tenderness and no fullness. Vaginal cuff normal.  No erythema,  no vaginal discharge, tenderness, bleeding, rectocele, cystocele or unspecified prolapse of vaginal walls in the vagina.    No foreign body in the vagina.      No signs of injury in the vagina.   Cervix is absent.Uterus is absent.                Skin: Skin is warm and dry.    Psychiatric: She has a normal mood and affect. Her speech is normal and behavior is normal.        Assessment:        1. Menopause syndrome    2. Encounter for screening mammogram for malignant neoplasm of breast                Plan:      Menopause syndrome    Encounter for screening mammogram for malignant  neoplasm of breast  -     Mammo Digital Screening Bilat w/ Brett; Future; Expected date: 01/31/2023      Follow up in about 1 year (around 1/31/2024).

## 2023-04-11 RX ORDER — ALOE VERA
POWDER (GRAM) MISCELLANEOUS
Qty: 60 EACH | Refills: 0 | Status: SHIPPED | OUTPATIENT
Start: 2023-04-11 | End: 2023-08-03 | Stop reason: SDUPTHER

## 2023-04-19 ENCOUNTER — OFFICE VISIT (OUTPATIENT)
Dept: GASTROENTEROLOGY | Facility: CLINIC | Age: 68
End: 2023-04-19
Payer: MEDICARE

## 2023-04-19 VITALS
OXYGEN SATURATION: 98 % | HEART RATE: 60 BPM | DIASTOLIC BLOOD PRESSURE: 79 MMHG | SYSTOLIC BLOOD PRESSURE: 119 MMHG | WEIGHT: 131.81 LBS | BODY MASS INDEX: 24.26 KG/M2 | HEIGHT: 62 IN

## 2023-04-19 DIAGNOSIS — Z86.010 PERSONAL HISTORY OF COLONIC POLYPS: ICD-10-CM

## 2023-04-19 DIAGNOSIS — K31.7 GASTRIC POLYPS: ICD-10-CM

## 2023-04-19 DIAGNOSIS — K76.89 HEPATIC CYST: ICD-10-CM

## 2023-04-19 DIAGNOSIS — K21.9 GASTROESOPHAGEAL REFLUX DISEASE, UNSPECIFIED WHETHER ESOPHAGITIS PRESENT: Primary | ICD-10-CM

## 2023-04-19 PROCEDURE — 99214 OFFICE O/P EST MOD 30 MIN: CPT | Mod: S$GLB,,, | Performed by: INTERNAL MEDICINE

## 2023-04-19 PROCEDURE — 99214 PR OFFICE/OUTPT VISIT, EST, LEVL IV, 30-39 MIN: ICD-10-PCS | Mod: S$GLB,,, | Performed by: INTERNAL MEDICINE

## 2023-04-19 NOTE — PROGRESS NOTES
Clinic Note    Reason for visit:  The primary encounter diagnosis was Gastroesophageal reflux disease, unspecified whether esophagitis present. Diagnoses of Hepatic cyst, Gastric polyps, and Personal history of colonic polyps were also pertinent to this visit.    PCP: Jose Sim       HPI:  This is a 68 y.o. female who is being seen for a follow up. She has h/o hepatic cysts. She takes omeprazole 20 mg for reflux which controls her symptoms, also has h/o gastric polyps.      US 5/10/2022: U/S shows no change in cysts in liver-gallbladder ok, small stones in right kidney-take in plenty of fluids    Colonoscopy 5/3/2022: diverticulosis of sigmoid, long redundant colon. Repeat in 5 yr    EGD 6/1/2021: benign gastric polyps-repeat EGD in 2 yrs    Review of Systems   Constitutional:  Negative for chills, diaphoresis, fatigue, fever and unexpected weight change.   HENT:  Negative for mouth sores, nosebleeds, postnasal drip, sore throat, trouble swallowing and voice change.    Eyes:  Negative for pain, discharge and eye dryness.   Respiratory:  Negative for apnea, cough, choking, chest tightness, shortness of breath and wheezing.    Cardiovascular:  Negative for chest pain, palpitations, leg swelling and claudication.   Gastrointestinal:  Negative for abdominal distention, abdominal pain, anal bleeding, blood in stool, change in bowel habit, constipation, diarrhea, nausea, rectal pain, vomiting, reflux, fecal incontinence and change in bowel habit.   Genitourinary:  Negative for bladder incontinence, difficulty urinating, dysuria, flank pain, frequency and hematuria.   Musculoskeletal:  Negative for arthralgias, back pain, joint swelling and joint deformity.   Integumentary:  Negative for color change, rash and wound.   Allergic/Immunologic: Negative for environmental allergies and food allergies.   Neurological:  Negative for seizures, facial asymmetry, speech difficulty, weakness, headaches and memory loss.    Hematological:  Negative for adenopathy. Does not bruise/bleed easily.   Psychiatric/Behavioral:  Negative for agitation, behavioral problems, confusion, hallucinations and sleep disturbance.       Past Medical History:   Diagnosis Date    Atrophic vaginitis     BMI 24.0-24.9, adult     Breast cancer     left    Colon polyp     Dyslipidemia     Environmental and seasonal allergies     Generalized anxiety disorder     Hypothyroidism, unspecified     Osteopenia     Polyp of stomach      Past Surgical History:   Procedure Laterality Date    BREAST LUMPECTOMY Left     CATARACT EXTRACTION      COLONOSCOPY      ESOPHAGOGASTRODUODENOSCOPY  06/01/2021    repeat EGD due 6/1/2023    INTRAOCULAR PROSTHESES INSERTION      SURGICAL REMOVAL OF LYMPH NODE       Family History   Problem Relation Age of Onset    Ovarian cancer Mother     Colon cancer Neg Hx     Inflammatory bowel disease Neg Hx     Liver cancer Neg Hx     Stomach cancer Neg Hx     Rectal cancer Neg Hx     Liver disease Neg Hx     Pancreatic cancer Neg Hx     Throat cancer Neg Hx     Esophageal cancer Neg Hx     Ulcerative colitis Neg Hx     Crohn's disease Neg Hx      Social History     Tobacco Use    Smoking status: Never    Smokeless tobacco: Never   Substance Use Topics    Alcohol use: Yes     Alcohol/week: 1.0 standard drink     Types: 1 Glasses of wine per week    Drug use: Never     Review of patient's allergies indicates:  No Known Allergies     Medication List with Changes/Refills   Current Medications    ALOE VERA, BULK, POWD    Apply 2-4 clicks externally as needed    CALCIUM CARBONATE (OS-GUILLE) 600 MG CALCIUM (1,500 MG) TAB    Take 1 tablet by mouth 2 (two) times daily with meals.    CALCIUM-MINERALS-D3-K2-SILICON 200 MG CALCIUM- 200 UNIT TAB    Take 600 mg by mouth 2 (two) times daily.    COENZYME Q10 10 MG CAPSULE    Take 10 mg by mouth once daily.    ESCITALOPRAM OXALATE (LEXAPRO) 5 MG TAB    Take 5 mg by mouth once daily.    FLUOCINONIDE (LIDEX) 0.05  "% EXTERNAL SOLUTION    APPLY TO THE AFFECTED AREA ONE TO TWICE DAILY AS NEEDED    FLUTICASONE PROPIONATE (FLONASE) 50 MCG/ACTUATION NASAL SPRAY    1 spray by Each Nostril route daily as needed for Rhinitis.    LEVOTHYROXINE (SYNTHROID) 25 MCG TABLET    Take 25 mcg by mouth once daily.    OMEGA-3 FATTY ACIDS/FISH OIL (FISH OIL-OMEGA-3 FATTY ACIDS) 300-1,000 MG CAPSULE    Take 1 capsule by mouth once daily.    OMEPRAZOLE (PRILOSEC) 20 MG CAPSULE    omeprazole 20 mg capsule,delayed release   Take 1 capsule every day by oral route as needed.    RESTASIS 0.05 % OPHTHALMIC EMULSION    INSTILL ONE DROP INTO EACH EYE TWICE DAILY, APPROXIMATELY TWELVE HOURS APART, FOR DRY EYE    ROSUVASTATIN (CRESTOR) 5 MG TABLET    Take 5 mg by mouth once daily.    TRAZODONE (DESYREL) 50 MG TABLET    Take 50 mg by mouth every evening.    VITAMIN K2 ORAL    Take 50 mcg by mouth.    ZINC 50 MG TAB    Take 50 mg by mouth once daily.         Vital Signs:  /79   Pulse 60   Ht 5' 1.5" (1.562 m)   Wt 59.8 kg (131 lb 12.8 oz)   SpO2 98%   BMI 24.50 kg/m²         Physical Exam  Vitals reviewed.   Constitutional:       General: She is awake. She is not in acute distress.     Appearance: Normal appearance. She is well-developed. She is not ill-appearing, toxic-appearing or diaphoretic.   HENT:      Head: Normocephalic and atraumatic.      Nose: Nose normal.      Mouth/Throat:      Mouth: Mucous membranes are moist.      Pharynx: Oropharynx is clear. No oropharyngeal exudate or posterior oropharyngeal erythema.   Eyes:      General: Lids are normal. Gaze aligned appropriately. No scleral icterus.        Right eye: No discharge.         Left eye: No discharge.      Extraocular Movements: Extraocular movements intact.      Conjunctiva/sclera: Conjunctivae normal.   Neck:      Trachea: Trachea normal.   Cardiovascular:      Rate and Rhythm: Normal rate and regular rhythm.      Pulses:           Radial pulses are 2+ on the right side and 2+ on " the left side.   Pulmonary:      Effort: Pulmonary effort is normal. No respiratory distress.      Breath sounds: Normal breath sounds. No stridor. No wheezing or rhonchi.   Chest:      Chest wall: No tenderness.   Abdominal:      General: Bowel sounds are normal. There is no distension.      Palpations: Abdomen is soft. There is no fluid wave, hepatomegaly or mass.      Tenderness: There is no abdominal tenderness. There is no guarding or rebound.   Musculoskeletal:         General: No tenderness or deformity.      Cervical back: Full passive range of motion without pain and neck supple. No tenderness.      Right lower leg: No edema.      Left lower leg: No edema.   Lymphadenopathy:      Cervical: No cervical adenopathy.   Skin:     General: Skin is warm and dry.      Capillary Refill: Capillary refill takes less than 2 seconds.      Coloration: Skin is not cyanotic, jaundiced or pale.      Findings: No rash.   Neurological:      General: No focal deficit present.      Mental Status: She is alert and oriented to person, place, and time.      Cranial Nerves: No facial asymmetry.      Motor: No tremor.   Psychiatric:         Attention and Perception: Attention normal.         Mood and Affect: Mood and affect normal.         Speech: Speech normal.         Behavior: Behavior normal. Behavior is cooperative.          All of the data above and below has been reviewed by myself and any further interpretations will be reflected in the assessment and plan.   The data includes review of external notes, and independent interpretation of lab results, procedures, x-rays, and imaging reports.      Assessment:  Gastroesophageal reflux disease, unspecified whether esophagitis present  -     Ambulatory Referral to External Surgery    Hepatic cyst  -     US Abdomen Complete; Future; Expected date: 04/19/2023    Gastric polyps  -     Ambulatory Referral to External Surgery    Personal history of colonic polyps      Will repeat abd US  to monitor liver cysts.   Due for EGD 6/2023  Colonoscopy due 7/2027     Recommendations:  Schedule ultrasound.  Schedule EGD at [facility] after 6/1/2023    Risks, benefits, and alternatives of medical management, any associated procedures, and/or treatment discussed with the patient. Patient given opportunity to ask questions and voices understanding. Patient has elected to proceed with the recommended care modalities as discussed.    Follow up in about 6 months (around 10/19/2023).    Order summary:  Orders Placed This Encounter   Procedures    US Abdomen Complete    Ambulatory Referral to External Surgery        Instructed patient to notify my office if they have not been contacted within two weeks after any procedures, submitting any samples (biopsies, blood, stool, urine, etc.) or after any imaging (X-ray, CT, MRI, etc.).     Dwight Waldron MD    This document may have been created using a voice recognition transcribing system. Incorrect words or phrases may have been missed during proofreading. Please interpret accordingly or contact me for clarification.

## 2023-04-19 NOTE — LETTER
April 19, 2023        Jose Sim MD  [address]  [place] [address]             [facility]  [address] [place] [address]  Phone: [phone]  Fax: [phone]   Patient: Christiane Harry   MR Number: [id number]   YOB: 1955   Date of Visit: 4/19/2023       Dear Dr. Sim:    Thank you for referring Christiane Harry to me for evaluation. Attached you will find relevant portions of my assessment and plan of care.    If you have questions, please do not hesitate to call me. I look forward to following Christiane Harry along with you.    Sincerely,      Dwight Waldron MD            CC  No Recipients    Enclosure

## 2023-04-19 NOTE — PATIENT INSTRUCTIONS
Schedule ultrasound.  Schedule upper endoscopy.    Please notify my office if you have not been contacted within two weeks after any procedures, submitting any samples (biopsies, blood, stool, urine, etc.) or after any imaging (X-ray, CT, MRI, etc.).

## 2023-05-04 NOTE — PROGRESS NOTES
Call with results, appears liver cysts are stable. Need to contact radiology as they said renal instead of liver cyst. Needs to file an addendum in regards to that. US in 1 year.

## 2023-05-05 ENCOUNTER — TELEPHONE (OUTPATIENT)
Dept: GASTROENTEROLOGY | Facility: CLINIC | Age: 68
End: 2023-05-05
Payer: MEDICARE

## 2023-05-08 PROBLEM — N39.0 URINARY TRACT INFECTIOUS DISEASE: Status: RESOLVED | Noted: 2023-01-31 | Resolved: 2023-05-08

## 2023-05-08 PROBLEM — J01.90 ACUTE SINUSITIS: Status: RESOLVED | Noted: 2023-01-31 | Resolved: 2023-05-08

## 2023-05-31 DIAGNOSIS — K31.7 GASTRIC POLYPS: ICD-10-CM

## 2023-05-31 DIAGNOSIS — K21.9 GASTROESOPHAGEAL REFLUX DISEASE, UNSPECIFIED WHETHER ESOPHAGITIS PRESENT: Primary | ICD-10-CM

## 2023-05-31 NOTE — PROGRESS NOTES
"[facility]  [address] [place] [address]  Phone: [phone]  Fax: [phone]    History & Physical         Provider: Dr. Dwight Waldron    Patient Name: Christiane WINTERS (age):1955  68 y.o.           Gender: female   Phone: [phone]     Referring Physician: Jose Sim     Vital Signs:   Height - 5' 1"  Weight - 131 lbs  BMI -  24.7    Plan: EGD    Encounter Diagnoses   Name Primary?    Gastroesophageal reflux disease, unspecified whether esophagitis present Yes    Gastric polyps            History:      Past Medical History:   Diagnosis Date    Atrophic vaginitis     BMI 24.0-24.9, adult     Breast cancer     left    Colon polyp     Dyslipidemia     Environmental and seasonal allergies     Generalized anxiety disorder     Hypothyroidism, unspecified     Osteopenia     Polyp of stomach       Past Surgical History:   Procedure Laterality Date    BREAST LUMPECTOMY Left     CATARACT EXTRACTION      COLONOSCOPY      ESOPHAGOGASTRODUODENOSCOPY  2021    repeat EGD due 2023    INTRAOCULAR PROSTHESES INSERTION      SURGICAL REMOVAL OF LYMPH NODE        Medication List with Changes/Refills   Current Medications    ALOE VERA, BULK, POWD    Apply 2-4 clicks externally as needed    CALCIUM CARBONATE (OS-GUILLE) 600 MG CALCIUM (1,500 MG) TAB    Take 1 tablet by mouth 2 (two) times daily with meals.    CALCIUM-MINERALS-D3-K2-SILICON 200 MG CALCIUM- 200 UNIT TAB    Take 600 mg by mouth 2 (two) times daily.    COENZYME Q10 10 MG CAPSULE    Take 10 mg by mouth once daily.    ESCITALOPRAM OXALATE (LEXAPRO) 5 MG TAB    Take 5 mg by mouth once daily.    FLUOCINONIDE (LIDEX) 0.05 % EXTERNAL SOLUTION    APPLY TO THE AFFECTED AREA ONE TO TWICE DAILY AS NEEDED    FLUTICASONE PROPIONATE (FLONASE) 50 MCG/ACTUATION NASAL SPRAY    1 spray by Each Nostril route daily as needed for Rhinitis.    " LEVOTHYROXINE (SYNTHROID) 25 MCG TABLET    Take 25 mcg by mouth once daily.    OMEGA-3 FATTY ACIDS/FISH OIL (FISH OIL-OMEGA-3 FATTY ACIDS) 300-1,000 MG CAPSULE    Take 1 capsule by mouth once daily.    OMEPRAZOLE (PRILOSEC) 20 MG CAPSULE    omeprazole 20 mg capsule,delayed release   Take 1 capsule every day by oral route as needed.    RESTASIS 0.05 % OPHTHALMIC EMULSION    INSTILL ONE DROP INTO EACH EYE TWICE DAILY, APPROXIMATELY TWELVE HOURS APART, FOR DRY EYE    ROSUVASTATIN (CRESTOR) 5 MG TABLET    Take 5 mg by mouth once daily.    TRAZODONE (DESYREL) 50 MG TABLET    Take 50 mg by mouth every evening.    VITAMIN K2 ORAL    Take 50 mcg by mouth.    ZINC 50 MG TAB    Take 50 mg by mouth once daily.      Review of patient's allergies indicates:  No Known Allergies   Family History   Problem Relation Age of Onset    Ovarian cancer Mother     Colon cancer Neg Hx     Inflammatory bowel disease Neg Hx     Liver cancer Neg Hx     Stomach cancer Neg Hx     Rectal cancer Neg Hx     Liver disease Neg Hx     Pancreatic cancer Neg Hx     Throat cancer Neg Hx     Esophageal cancer Neg Hx     Ulcerative colitis Neg Hx     Crohn's disease Neg Hx       Social History     Tobacco Use    Smoking status: Never    Smokeless tobacco: Never   Substance Use Topics    Alcohol use: Yes     Alcohol/week: 1.0 standard drink     Types: 1 Glasses of wine per week    Drug use: Never        Physical Examination:     General Appearance:___________________________  HEENT: _____________________________________  Abdomen:____________________________________  Heart:________________________________________  Lungs:_______________________________________  Extremities:___________________________________  Skin:_________________________________________  Endocrine:____________________________________  Genitourinary:_________________________________  Neurological:__________________________________      Patient has been evaluated immediately prior to sedation  and is medically cleared for endoscopy with IVCS as an ASA class: ______      Physician Signature: _________________________       Date: ________  Time: ________

## 2023-06-06 ENCOUNTER — OUTSIDE PLACE OF SERVICE (OUTPATIENT)
Dept: GASTROENTEROLOGY | Facility: CLINIC | Age: 68
End: 2023-06-06

## 2023-06-06 LAB — EGD FOLLOW UP EXTERNAL: NORMAL

## 2023-06-06 PROCEDURE — 43239 EGD BIOPSY SINGLE/MULTIPLE: CPT | Mod: ,,, | Performed by: INTERNAL MEDICINE

## 2023-06-06 PROCEDURE — 43239 PR EGD, FLEX, W/BIOPSY, SGL/MULTI: ICD-10-PCS | Mod: ,,, | Performed by: INTERNAL MEDICINE

## 2023-07-31 ENCOUNTER — DOCUMENTATION ONLY (OUTPATIENT)
Dept: GASTROENTEROLOGY | Facility: CLINIC | Age: 68
End: 2023-07-31
Payer: MEDICARE

## 2023-08-03 NOTE — TELEPHONE ENCOUNTER
----- Message from Sonya John sent at 8/3/2023  9:58 AM CDT -----  Contact: self  Type:  RX Refill Request  Who Called: Christiane Harry  Refill or New Rx:refill  RX Name and Strength:Aloe 2% VIT E 150 IU cream  How is the patient currently taking it? (ex. 1XDay):1 daily  Is this a 30 day or 90 day RX:2 tubes  Preferred Pharmacy with phone number:  [facility] - [address] [address]  Phone: [phone] Fax: [phone]  Local or Mail Order:local  Ordering Provider:Dr. Cristal Olivarez  Would the patient rather a call back or a response via [facility]? [facility]  Best Call Back Number:[phone]  Additional Information: please advise

## 2023-08-07 ENCOUNTER — TELEPHONE (OUTPATIENT)
Dept: OBSTETRICS AND GYNECOLOGY | Facility: CLINIC | Age: 68
End: 2023-08-07
Payer: MEDICARE

## 2023-08-07 RX ORDER — ALOE VERA
POWDER (GRAM) MISCELLANEOUS
Qty: 60 EACH | Refills: 2 | Status: SHIPPED | OUTPATIENT
Start: 2023-08-07

## 2023-08-07 NOTE — TELEPHONE ENCOUNTER
----- Message from Shelly Blevins sent at 8/4/2023  4:47 PM CDT -----  Type:  Needs Medical Advice    Who Called: Christiane Harry   Symptoms (please be specific): -   How long has patient had these symptoms:  -  Pharmacy name and phone #:  -  Would the patient rather a call back or a response via [facility]?    Best Call Back Number: [phone]    Additional Information: pt  checking status of medication being called in says she has been waiting on this all week, and is very disappointed, please call

## 2023-08-07 NOTE — TELEPHONE ENCOUNTER
Returned patient's call in regards to her message. No answer. Left another detailed message stating Dr. Olivarez has been out of the office and the refill request was sent to her to refill when she returns to the office today. The prescription will be sent to [facility] since its a compounded medication, Aloe and E Supp.   Christie Sheppard

## 2023-08-07 NOTE — TELEPHONE ENCOUNTER
----- Message from Lucina Natarajan sent at 8/7/2023 10:27 AM CDT -----  Type:  Patient Returning Call    Who Called:pt  Who Left Message for Patient:Katie  Does the patient know what this is regarding?:return call/prescription  Would the patient rather a call back or a response via [facility]? call  Best Call Back Number:[phone]  Additional Information: .    Thank you     [id number] [address] [address]  Phone: [phone] Fax: [phone]

## 2023-10-25 ENCOUNTER — OFFICE VISIT (OUTPATIENT)
Dept: GASTROENTEROLOGY | Facility: CLINIC | Age: 68
End: 2023-10-25
Payer: MEDICARE

## 2023-10-25 VITALS
DIASTOLIC BLOOD PRESSURE: 86 MMHG | OXYGEN SATURATION: 97 % | BODY MASS INDEX: 24.48 KG/M2 | SYSTOLIC BLOOD PRESSURE: 142 MMHG | HEART RATE: 62 BPM | HEIGHT: 62 IN | WEIGHT: 133 LBS

## 2023-10-25 DIAGNOSIS — K31.7 GASTRIC POLYPS: ICD-10-CM

## 2023-10-25 DIAGNOSIS — K21.9 GASTROESOPHAGEAL REFLUX DISEASE, UNSPECIFIED WHETHER ESOPHAGITIS PRESENT: Primary | ICD-10-CM

## 2023-10-25 DIAGNOSIS — K76.89 HEPATIC CYST: ICD-10-CM

## 2023-10-25 DIAGNOSIS — Z86.010 PERSONAL HISTORY OF COLONIC POLYPS: ICD-10-CM

## 2023-10-25 PROCEDURE — 99214 OFFICE O/P EST MOD 30 MIN: CPT | Mod: S$GLB,,, | Performed by: INTERNAL MEDICINE

## 2023-10-25 PROCEDURE — 99214 PR OFFICE/OUTPT VISIT, EST, LEVL IV, 30-39 MIN: ICD-10-PCS | Mod: S$GLB,,, | Performed by: INTERNAL MEDICINE

## 2023-10-25 RX ORDER — OMEPRAZOLE 20 MG/1
20 CAPSULE, DELAYED RELEASE ORAL DAILY
Qty: 90 CAPSULE | Refills: 3 | Status: SHIPPED | OUTPATIENT
Start: 2023-10-25 | End: 2024-10-19

## 2023-10-25 NOTE — PROGRESS NOTES
Clinic Note    Reason for visit:  The primary encounter diagnosis was Gastroesophageal reflux disease, unspecified whether esophagitis present. Diagnoses of Gastric polyps, Hepatic cyst, and Personal history of colonic polyps were also pertinent to this visit.    PCP: Jose Sim       HPI:  This is a 68 y.o. female who is being seen for a follow up. She has h/o hepatic cysts. She takes omeprazole 20 mg for reflux which controls her symptoms, also has h/o gastric polyps. She denies any issues at this time.    EGD 6/6/2023: Small HH, widely patent LE ring. Four sessile polyps in stomach body. Benign stomach polyps-repeat EGD in 2 yrs.    US 5/10/2022: U/S shows no change in cysts in liver-gallbladder ok, small stones in right kidney-take in plenty of fluids     Colonoscopy 5/3/2022: diverticulosis of sigmoid, long redundant colon. Repeat in 5 yr     EGD 6/1/2021: benign gastric polyps-repeat EGD in 2 yrs    Review of Systems   Constitutional:  Negative for fatigue, fever and unexpected weight change.   HENT:  Negative for mouth sores, postnasal drip, sore throat and trouble swallowing.    Eyes:  Negative for pain, discharge and eye dryness.   Respiratory:  Negative for apnea, cough, choking, chest tightness, shortness of breath and wheezing.    Cardiovascular:  Negative for chest pain, palpitations and leg swelling.   Gastrointestinal:  Negative for abdominal distention, abdominal pain, anal bleeding, blood in stool, change in bowel habit, constipation, diarrhea, nausea, rectal pain, vomiting, reflux and fecal incontinence.   Genitourinary:  Negative for bladder incontinence, dysuria and hematuria.   Musculoskeletal:  Negative for arthralgias, back pain and joint swelling.   Integumentary:  Negative for color change and rash.   Allergic/Immunologic: Negative for environmental allergies and food allergies.   Neurological:  Negative for seizures and headaches.   Hematological:  Negative for adenopathy. Does not  bruise/bleed easily.        Past Medical History:   Diagnosis Date    Atrophic vaginitis     BMI 24.0-24.9, adult     Breast cancer     left    Colon polyp     Dyslipidemia     Environmental and seasonal allergies     Generalized anxiety disorder     Hypothyroidism, unspecified     Osteopenia     Polyp of stomach      Past Surgical History:   Procedure Laterality Date    BREAST LUMPECTOMY Left     CATARACT EXTRACTION      COLONOSCOPY      ESOPHAGOGASTRODUODENOSCOPY  06/01/2021    repeat EGD due 6/1/2023    INTRAOCULAR PROSTHESES INSERTION      SURGICAL REMOVAL OF LYMPH NODE       Family History   Problem Relation Age of Onset    Ovarian cancer Mother     Colon cancer Neg Hx     Inflammatory bowel disease Neg Hx     Liver cancer Neg Hx     Stomach cancer Neg Hx     Rectal cancer Neg Hx     Liver disease Neg Hx     Pancreatic cancer Neg Hx     Throat cancer Neg Hx     Esophageal cancer Neg Hx     Ulcerative colitis Neg Hx     Crohn's disease Neg Hx      Social History     Tobacco Use    Smoking status: Never    Smokeless tobacco: Never   Substance Use Topics    Alcohol use: Yes     Alcohol/week: 1.0 standard drink of alcohol     Types: 1 Glasses of wine per week    Drug use: Never     Review of patient's allergies indicates:  No Known Allergies     Medication List with Changes/Refills   Current Medications    ALOE VERA, BULK, POWD    Apply 2-4 clicks externally as needed    CALCIUM CARBONATE (OS-GUILLE) 600 MG CALCIUM (1,500 MG) TAB    Take 1 tablet by mouth 2 (two) times daily with meals.    CALCIUM-MINERALS-D3-K2-SILICON 200 MG CALCIUM- 200 UNIT TAB    Take 600 mg by mouth 2 (two) times daily.    COENZYME Q10 10 MG CAPSULE    Take 10 mg by mouth once daily.    ESCITALOPRAM OXALATE (LEXAPRO) 5 MG TAB    Take 5 mg by mouth once daily.    FLUOCINONIDE (LIDEX) 0.05 % EXTERNAL SOLUTION    APPLY TO THE AFFECTED AREA ONE TO TWICE DAILY AS NEEDED    FLUTICASONE PROPIONATE (FLONASE) 50 MCG/ACTUATION NASAL SPRAY    1 spray by  "Each Nostril route daily as needed for Rhinitis.    LEVOTHYROXINE (SYNTHROID) 25 MCG TABLET    Take 25 mcg by mouth once daily.    OMEGA-3 FATTY ACIDS/FISH OIL (FISH OIL-OMEGA-3 FATTY ACIDS) 300-1,000 MG CAPSULE    Take 1 capsule by mouth once daily.    RESTASIS 0.05 % OPHTHALMIC EMULSION    INSTILL ONE DROP INTO EACH EYE TWICE DAILY, APPROXIMATELY TWELVE HOURS APART, FOR DRY EYE    ROSUVASTATIN (CRESTOR) 5 MG TABLET    Take 5 mg by mouth once daily.    TRAZODONE (DESYREL) 50 MG TABLET    Take 50 mg by mouth every evening.    VITAMIN K2 ORAL    Take 50 mcg by mouth.    ZINC 50 MG TAB    Take 50 mg by mouth once daily.   Changed and/or Refilled Medications    Modified Medication Previous Medication    OMEPRAZOLE (PRILOSEC) 20 MG CAPSULE omeprazole (PRILOSEC) 20 MG capsule       Take 1 capsule (20 mg total) by mouth once daily.    omeprazole 20 mg capsule,delayed release   Take 1 capsule every day by oral route as needed.         Vital Signs:  BP (!) 142/86   Pulse 62   Ht 5' 1.5" (1.562 m)   Wt 60.3 kg (133 lb)   SpO2 97%   BMI 24.72 kg/m²         Physical Exam  Vitals reviewed.   Constitutional:       General: She is awake. She is not in acute distress.     Appearance: Normal appearance. She is well-developed. She is not ill-appearing, toxic-appearing or diaphoretic.   HENT:      Head: Normocephalic and atraumatic.      Nose: Nose normal.      Mouth/Throat:      Mouth: Mucous membranes are moist.      Pharynx: Oropharynx is clear. No oropharyngeal exudate or posterior oropharyngeal erythema.   Eyes:      General: Lids are normal. Gaze aligned appropriately. No scleral icterus.        Right eye: No discharge.         Left eye: No discharge.      Conjunctiva/sclera: Conjunctivae normal.   Neck:      Trachea: Trachea normal.   Cardiovascular:      Rate and Rhythm: Normal rate and regular rhythm.      Pulses:           Radial pulses are 2+ on the right side and 2+ on the left side.   Pulmonary:      Effort: " Pulmonary effort is normal. No respiratory distress.      Breath sounds: No stridor. No wheezing.   Chest:      Chest wall: No tenderness.   Abdominal:      General: Bowel sounds are normal. There is no distension.      Palpations: Abdomen is soft. There is no fluid wave, hepatomegaly or mass.      Tenderness: There is no abdominal tenderness. There is no guarding or rebound.   Musculoskeletal:         General: No tenderness or deformity.      Cervical back: Full passive range of motion without pain and neck supple. No tenderness.      Right lower leg: No edema.      Left lower leg: No edema.   Lymphadenopathy:      Cervical: No cervical adenopathy.   Skin:     General: Skin is warm and dry.      Capillary Refill: Capillary refill takes less than 2 seconds.      Coloration: Skin is not cyanotic, jaundiced or pale.   Neurological:      General: No focal deficit present.      Mental Status: She is alert and oriented to person, place, and time.      Motor: No tremor.   Psychiatric:         Attention and Perception: Attention normal.         Mood and Affect: Mood and affect normal.         Speech: Speech normal.         Behavior: Behavior normal. Behavior is cooperative.            All of the data above and below has been reviewed by myself and any further interpretations will be reflected in the assessment and plan.   The data includes review of external notes, and independent interpretation of lab results, procedures, x-rays, and imaging reports.      Assessment:  Gastroesophageal reflux disease, unspecified whether esophagitis present  -     omeprazole (PRILOSEC) 20 MG capsule; Take 1 capsule (20 mg total) by mouth once daily.  Dispense: 90 capsule; Refill: 3    Gastric polyps    Hepatic cyst    Personal history of colonic polyps  Comments:  Colonoscopy due 2027         Recommendations:    Continue with omeprazole 20mg daily.      Risks, benefits, and alternatives of medical management, any associated procedures,  and/or treatment discussed with the patient. Patient given opportunity to ask questions and voices understanding. Patient has elected to proceed with the recommended care modalities as discussed.    Instructed patient to notify my office if they have not been contacted within two weeks after any procedures, submitting any samples (biopsies, blood, stool, urine, etc.) or after any imaging (X-ray, CT, MRI, etc.).     Follow up if symptoms worsen or fail to improve.    Order summary:  No orders of the defined types were placed in this encounter.         This document may have been created using a voice recognition transcribing system. Incorrect words or phrases may have been missed during proofreading. Please interpret accordingly or contact me for clarification.     Dwight Waldron MD

## 2023-10-25 NOTE — PATIENT INSTRUCTIONS
Continue with omeprazole 20mg daily.    Please notify my office if you have not been contacted within two weeks after any procedures, submitting any samples (biopsies, blood, stool, urine, etc.) or after any imaging (X-ray, CT, MRI, etc.).

## 2024-02-07 ENCOUNTER — OFFICE VISIT (OUTPATIENT)
Dept: OBSTETRICS AND GYNECOLOGY | Facility: CLINIC | Age: 69
End: 2024-02-07
Payer: MEDICARE

## 2024-02-07 VITALS
SYSTOLIC BLOOD PRESSURE: 128 MMHG | WEIGHT: 134 LBS | BODY MASS INDEX: 24.91 KG/M2 | DIASTOLIC BLOOD PRESSURE: 77 MMHG | HEART RATE: 71 BPM

## 2024-02-07 DIAGNOSIS — Z01.419 GYNECOLOGIC EXAM NORMAL: Primary | ICD-10-CM

## 2024-02-07 DIAGNOSIS — Z12.31 ENCOUNTER FOR SCREENING MAMMOGRAM FOR MALIGNANT NEOPLASM OF BREAST: ICD-10-CM

## 2024-02-07 PROCEDURE — G0101 CA SCREEN;PELVIC/BREAST EXAM: HCPCS | Mod: S$GLB,,, | Performed by: NURSE PRACTITIONER

## 2024-02-07 NOTE — PROGRESS NOTES
Subjective:      Patient ID: Christiane Harry is a 69 y.o. female.    Chief Complaint:  Well Woman      History of Present Illness  HPI  Annual Exam-Postmenopausal  Patient presents for annual exam. The patient has no complaints today.  GYN screening history: last pap: was normal and last mammogram: was normal. The patient is not taking hormone replacement therapy. Patient denies post-menopausal vaginal bleeding. Doing well wo complaints    Outpatient Medications Marked as Taking for the 2/7/24 encounter (Office Visit) with Dee Millan NP   Medication Sig Dispense Refill    aloe vera, bulk, Powd Apply 2-4 clicks externally as needed 60 each 2    calcium carbonate (OS-GUILLE) 600 mg calcium (1,500 mg) Tab Take 1 tablet by mouth 2 (two) times daily with meals.      calcium-minerals-D3-K2-silicon 200 mg calcium- 200 unit Tab Take 600 mg by mouth 2 (two) times daily.      coenzyme Q10 10 mg capsule Take 10 mg by mouth once daily.      EScitalopram oxalate (LEXAPRO) 5 MG Tab Take 5 mg by mouth once daily.      levothyroxine (SYNTHROID) 25 MCG tablet Take 25 mcg by mouth once daily.      omega-3 fatty acids/fish oil (FISH OIL-OMEGA-3 FATTY ACIDS) 300-1,000 mg capsule Take 1 capsule by mouth once daily.      omeprazole (PRILOSEC) 20 MG capsule Take 1 capsule (20 mg total) by mouth once daily. 90 capsule 3    rosuvastatin (CRESTOR) 5 MG tablet Take 5 mg by mouth once daily.      traZODone (DESYREL) 50 MG tablet Take 50 mg by mouth every evening.      VITAMIN K2 ORAL Take 50 mcg by mouth.      zinc 50 mg Tab Take 50 mg by mouth once daily.       Vitals:    02/07/24 1435   BP: 128/77   Pulse: 71   Weight: 60.8 kg (134 lb)     Past Medical History:   Diagnosis Date    Atrophic vaginitis     BMI 24.0-24.9, adult     Breast cancer     left    Colon polyp     Dyslipidemia     Environmental and seasonal allergies     Generalized anxiety disorder     Hypothyroidism, unspecified     Osteopenia     Polyp of stomach      Past  Surgical History:   Procedure Laterality Date    BREAST LUMPECTOMY Left     CATARACT EXTRACTION      COLONOSCOPY      ESOPHAGOGASTRODUODENOSCOPY  2021    repeat EGD due 2023    INTRAOCULAR PROSTHESES INSERTION      SURGICAL REMOVAL OF LYMPH NODE           GYN & OB History  No LMP recorded. Patient is postmenopausal.   Date of Last Pap: No result found    OB History    Para Term  AB Living   2 1 1   1     SAB IAB Ectopic Multiple Live Births   1              # Outcome Date GA Lbr Danis/2nd Weight Sex Delivery Anes PTL Lv   2 SAB            1 Term      Vag-Spont          Review of Systems  Review of Systems   Constitutional:  Negative for activity change, appetite change, chills, fatigue and fever.   HENT:  Negative for nasal congestion and tinnitus.    Eyes:  Negative for visual disturbance.   Respiratory:  Negative for cough and shortness of breath.    Cardiovascular:  Negative for chest pain and palpitations.   Gastrointestinal:  Negative for abdominal pain, bloating, blood in stool, constipation, nausea and vomiting.   Endocrine: Negative for diabetes, hair loss and hot flashes.   Genitourinary:  Negative for bladder incontinence, decreased libido, dysmenorrhea, dyspareunia, dysuria, flank pain, frequency, genital sores, hematuria, hot flashes, menorrhagia, menstrual problem, pelvic pain, urgency, vaginal bleeding, vaginal discharge, vaginal pain, urinary incontinence, postcoital bleeding, postmenopausal bleeding, vaginal dryness and vaginal odor.   Musculoskeletal:  Negative for arthralgias, back pain, leg pain and myalgias.   Integumentary:  Negative for rash, acne, hair changes, mole/lesion, breast mass, nipple discharge, breast skin changes and breast tenderness.   Neurological:  Negative for vertigo, syncope, numbness and headaches.   Hematological:  Does not bruise/bleed easily.   Psychiatric/Behavioral:  Negative for depression and sleep disturbance. The patient is not nervous/anxious.     Breast: Negative for asymmetry, lump, mass, mastodynia, nipple discharge, skin changes and tenderness         Objective:     Physical Exam:   Constitutional: She is oriented to person, place, and time. Vital signs are normal. She appears well-developed and well-nourished.    HENT:   Head: Normocephalic.   Nose: No epistaxis.    Eyes: Lids are normal.    Neck: Trachea normal.    Cardiovascular:  Normal rate, regular rhythm and normal heart sounds.             Pulmonary/Chest: Effort normal and breath sounds normal. Right breast exhibits no mass, no skin change, no tenderness and no swelling. Left breast exhibits no mass, no skin change, no tenderness and no swelling. Breasts are symmetrical.        Abdominal: Soft.     Genitourinary:    Vagina, uterus and rectum normal.      Pelvic exam was performed with patient supine.     Labial bartholins normal.Cervix is normal. Right adnexum displays no mass and no tenderness. Left adnexum displays no mass and no tenderness. No erythema or vaginal discharge in the vagina. Vaginal atrophy noted.           Musculoskeletal: Normal range of motion and moves all extremeties.      Lymphadenopathy:     She has no cervical adenopathy.    Neurological: She is alert and oriented to person, place, and time.    Skin: Skin is warm and dry.    Psychiatric: She has a normal mood and affect. Her speech is normal and behavior is normal. Judgment and thought content normal.      Chaperone present for exam       Assessment:     1. Gynecologic exam normal    2. Encounter for screening mammogram for malignant neoplasm of breast               Plan:     Gynecologic exam normal    Encounter for screening mammogram for malignant neoplasm of breast  -     Mammo Digital Screening Bilat w/ Brett; Future; Expected date: 02/07/2024      Patient was counseled today on A.C.S. Pap guidelines and recommendations for yearly pelvic exams, mammograms and monthly self breast exams; to see her PCP for other health  maintenance.     Follow up in about 1 year (around 2/7/2025).

## 2024-02-14 ENCOUNTER — TELEPHONE (OUTPATIENT)
Dept: UROLOGY | Facility: CLINIC | Age: 69
End: 2024-02-14
Payer: MEDICARE

## 2024-02-14 NOTE — TELEPHONE ENCOUNTER
Dr. Preciado aware of consult.    ----- Message from Annette Coffman sent at 2/14/2024  9:53 AM CST -----  Contact: Kalyani)  Katie called to consult with nurse or staff regarding a hospital consult for the patient. She would like a call back and can be reached at [phone]. Thanks/MR

## 2024-02-16 ENCOUNTER — TELEPHONE (OUTPATIENT)
Dept: UROLOGY | Facility: CLINIC | Age: 69
End: 2024-02-16
Payer: MEDICARE

## 2024-02-16 NOTE — TELEPHONE ENCOUNTER
Pt was a hospital consult and Dr. Preciado would like to see pt in clinic. I have scheduled pt.       ----- Message from Marissa Lopez sent at 2/16/2024 10:53 AM CST -----  Regarding: Appointmen t  Contact: [facility]  Per phone call with Adia, she is needing to schedule an appointment to see the physician regarding a hospital follow up within one to two weeks. Please return call at [phone] (home).    Thanks,  SJ

## 2024-02-20 ENCOUNTER — OFFICE VISIT (OUTPATIENT)
Dept: UROLOGY | Facility: CLINIC | Age: 69
End: 2024-02-20
Payer: MEDICARE

## 2024-02-20 VITALS
DIASTOLIC BLOOD PRESSURE: 80 MMHG | HEIGHT: 61 IN | SYSTOLIC BLOOD PRESSURE: 183 MMHG | HEART RATE: 65 BPM | WEIGHT: 134 LBS | BODY MASS INDEX: 25.3 KG/M2

## 2024-02-20 DIAGNOSIS — S37.012A HEMATOMA OF LEFT KIDNEY, INITIAL ENCOUNTER: Primary | ICD-10-CM

## 2024-02-20 PROCEDURE — 99204 OFFICE O/P NEW MOD 45 MIN: CPT | Mod: S$GLB,,, | Performed by: UROLOGY

## 2024-02-20 RX ORDER — HYDROCODONE BITARTRATE AND ACETAMINOPHEN 7.5; 325 MG/1; MG/1
1 TABLET ORAL EVERY 8 HOURS
COMMUNITY
Start: 2024-02-14

## 2024-02-20 RX ORDER — CEFDINIR 300 MG/1
300 CAPSULE ORAL 2 TIMES DAILY
COMMUNITY
Start: 2024-02-14

## 2024-02-20 NOTE — PROGRESS NOTES
Subjective:       Patient ID: Christiane Harry is a 69 y.o. female.    Chief Complaint: Kidney Injury      HPI:  69-year-old female recently had a fall from the stairs hit a table developed a grade 1 renal injury no extravasation there was a 3 cm hematoma subcapsular    Past Medical History:   Past Medical History:   Diagnosis Date    Atrophic vaginitis     BMI 24.0-24.9, adult     Breast cancer     left    Colon polyp     Dyslipidemia     Environmental and seasonal allergies     Generalized anxiety disorder     Hypothyroidism, unspecified     Osteopenia     Polyp of stomach        Past Surgical Historical:   Past Surgical History:   Procedure Laterality Date    BREAST LUMPECTOMY Left     CATARACT EXTRACTION      COLONOSCOPY      ESOPHAGOGASTRODUODENOSCOPY  06/01/2021    repeat EGD due 6/1/2023    INTRAOCULAR PROSTHESES INSERTION      SURGICAL REMOVAL OF LYMPH NODE          Medications:   Medication List with Changes/Refills   Current Medications    ALOE VERA, BULK, POWD    Apply 2-4 clicks externally as needed    CALCIUM CARBONATE (OS-GUILLE) 600 MG CALCIUM (1,500 MG) TAB    Take 1 tablet by mouth 2 (two) times daily with meals.    CALCIUM-MINERALS-D3-K2-SILICON 200 MG CALCIUM- 200 UNIT TAB    Take 600 mg by mouth 2 (two) times daily.    CEFDINIR (OMNICEF) 300 MG CAPSULE    Take 300 mg by mouth 2 (two) times daily.    COENZYME Q10 10 MG CAPSULE    Take 10 mg by mouth once daily.    ESCITALOPRAM OXALATE (LEXAPRO) 5 MG TAB    Take 5 mg by mouth once daily.    HYDROCODONE-ACETAMINOPHEN (NORCO) 7.5-325 MG PER TABLET    Take 1 tablet by mouth every 8 (eight) hours.    LEVOTHYROXINE (SYNTHROID) 25 MCG TABLET    Take 25 mcg by mouth once daily.    OMEGA-3 FATTY ACIDS/FISH OIL (FISH OIL-OMEGA-3 FATTY ACIDS) 300-1,000 MG CAPSULE    Take 1 capsule by mouth once daily.    OMEPRAZOLE (PRILOSEC) 20 MG CAPSULE    Take 1 capsule (20 mg total) by mouth once daily.    ROSUVASTATIN (CRESTOR) 5 MG TABLET    Take 5 mg by mouth once  daily.    TRAZODONE (DESYREL) 50 MG TABLET    Take 50 mg by mouth every evening.    VITAMIN K2 ORAL    Take 50 mcg by mouth.    ZINC 50 MG TAB    Take 50 mg by mouth once daily.        Past Social History:   Social History     Socioeconomic History    Marital status: Significant Other   Tobacco Use    Smoking status: Never    Smokeless tobacco: Never   Substance and Sexual Activity    Alcohol use: Yes     Alcohol/week: 1.0 standard drink of alcohol     Types: 1 Glasses of wine per week    Drug use: Never    Sexual activity: Not Currently     Partners: Male     Birth control/protection: Post-menopausal       Allergies: Review of patient's allergies indicates:  No Known Allergies     Family History:   Family History   Problem Relation Age of Onset    Ovarian cancer Mother     Colon cancer Neg Hx     Inflammatory bowel disease Neg Hx     Liver cancer Neg Hx     Stomach cancer Neg Hx     Rectal cancer Neg Hx     Liver disease Neg Hx     Pancreatic cancer Neg Hx     Throat cancer Neg Hx     Esophageal cancer Neg Hx     Ulcerative colitis Neg Hx     Crohn's disease Neg Hx         Review of Systems:  Review of Systems   Constitutional:  Negative for activity change and appetite change.   HENT:  Negative for congestion and dental problem.    Eyes:  Negative for pain and discharge.   Respiratory:  Negative for chest tightness and shortness of breath.    Cardiovascular:  Negative for chest pain.   Gastrointestinal:  Negative for abdominal distention and abdominal pain.   Endocrine: Negative for cold intolerance, heat intolerance and polyuria.   Genitourinary:  Negative for decreased urine volume, difficulty urinating, dyspareunia, dysuria, enuresis, flank pain, frequency, genital sores, hematuria, menstrual problem, pelvic pain, urgency, vaginal bleeding, vaginal discharge and vaginal pain.   Musculoskeletal:  Negative for back pain and neck pain.   Skin:  Negative for color change and rash.   Allergic/Immunologic: Negative  for immunocompromised state.   Neurological:  Negative for dizziness.   Hematological:  Negative for adenopathy.   Psychiatric/Behavioral:  Negative for agitation, behavioral problems and confusion.        Physical Exam:  Physical Exam  Constitutional:       Appearance: She is well-developed.   HENT:      Head: Normocephalic and atraumatic.      Right Ear: External ear normal.      Left Ear: External ear normal.   Eyes:      Conjunctiva/sclera: Conjunctivae normal.   Neck:      Vascular: No JVD.   Cardiovascular:      Rate and Rhythm: Normal rate and regular rhythm.   Pulmonary:      Effort: Pulmonary effort is normal. No respiratory distress.      Breath sounds: Normal breath sounds. No wheezing.   Abdominal:      General: There is no distension.      Palpations: Abdomen is soft.      Tenderness: There is no abdominal tenderness. There is no rebound.   Musculoskeletal:         General: Normal range of motion.      Cervical back: Normal range of motion.   Skin:     General: Skin is warm and dry.      Findings: No erythema or rash.   Neurological:      Mental Status: She is alert and oriented to person, place, and time.   Psychiatric:         Behavior: Behavior normal.         Assessment/Plan:       Problem List Items Addressed This Visit    None  Visit Diagnoses       Hematoma of left kidney, initial encounter    -  Primary               Subcapsular renal hematoma from fall:  I have personally reviewed the CT images there no indication for further imaging she was stable on the hospital she was instructed to take it easy for the next 3 months she does have broken ribs return to clinic if any issues transpire

## 2024-08-26 RX ORDER — ALOE VERA
POWDER (GRAM) MISCELLANEOUS
Qty: 60 EACH | Refills: 2 | Status: SHIPPED | OUTPATIENT
Start: 2024-08-26

## 2024-08-26 NOTE — TELEPHONE ENCOUNTER
----- Message from Shelly Blevins sent at 8/26/2024  9:05 AM CDT -----  Type:  RX Refill Request    Who Called: Christiane Harry    Refill or New Rx: refill   RX Name and Strength: aloe vera, bulk, Powd  How is the patient currently taking it? (ex. 1XDay): 2Xday   Is this a 30 day or 90 day RX:30  Preferred Pharmacy with phone number:    [facility] [place], [place] - [address] [facility] [address]  [address] [address]  [place] [address]  Phone: [phone] Fax: [phone]      Local or Mail Order:Mail   Ordering Provider:Juanis   Would the patient rather a call back or a response via [facility]? DAHLIA   Best Call Back Number:[phone]    Additional Information: pt says her pharmacy also  sent request for refill last week

## 2024-10-15 ENCOUNTER — TELEPHONE (OUTPATIENT)
Dept: GASTROENTEROLOGY | Facility: CLINIC | Age: 69
End: 2024-10-15
Payer: MEDICARE

## 2024-10-15 NOTE — TELEPHONE ENCOUNTER
----- Message from Med Assistant Rodriguez sent at 10/11/2024  4:17 PM CDT -----  Regarding: Previous Tee Patient    ----- Message -----  From: Ivis Kenny MA  Sent: 10/11/2024   3:58 PM CDT  To: Jennifer Kwong MA    Does she need a referral ? Looks like she previously saw kaleigh ?  ----- Message -----  From: Araceli Alcala  Sent: 10/10/2024  10:53 AM CDT  To: Kike CONNELLY Staff    Type:  Needs Medical Advice    Who Called: pt  Symptoms (please be specific): left side pain   How long has patient had these symptoms:  two and a half weeks  Pharmacy name and phone #:    [id number] - [place], [place] - [address] [address]  Phone: [phone] Fax: [phone]    [facility] - [place], [place] - [address] [facility] [address]  [address] [facility] [address]  [facility] [address]  Phone: [phone] Fax: [phone]      Would the patient rather a call back or a response via [facility]? call  Best Call Back Number: [phone]    Additional Information: previous patient of TEE requesting an appt

## 2025-05-01 ENCOUNTER — TELEPHONE (OUTPATIENT)
Dept: GASTROENTEROLOGY | Facility: CLINIC | Age: 70
End: 2025-05-01

## 2025-05-01 ENCOUNTER — OFFICE VISIT (OUTPATIENT)
Dept: GASTROENTEROLOGY | Facility: CLINIC | Age: 70
End: 2025-05-01
Payer: MEDICARE

## 2025-05-01 VITALS
BODY MASS INDEX: 24.26 KG/M2 | WEIGHT: 131.81 LBS | HEART RATE: 64 BPM | SYSTOLIC BLOOD PRESSURE: 145 MMHG | HEIGHT: 62 IN | DIASTOLIC BLOOD PRESSURE: 82 MMHG | OXYGEN SATURATION: 97 %

## 2025-05-01 DIAGNOSIS — Z86.0100 HISTORY OF COLON POLYPS: ICD-10-CM

## 2025-05-01 DIAGNOSIS — K21.9 GASTROESOPHAGEAL REFLUX DISEASE, UNSPECIFIED WHETHER ESOPHAGITIS PRESENT: Primary | ICD-10-CM

## 2025-05-01 DIAGNOSIS — K31.7 GASTRIC POLYPS: ICD-10-CM

## 2025-05-01 DIAGNOSIS — K76.89 HEPATIC CYST: ICD-10-CM

## 2025-05-01 DIAGNOSIS — Z79.02 LONG TERM (CURRENT) USE OF ANTITHROMBOTICS/ANTIPLATELETS: ICD-10-CM

## 2025-05-01 PROCEDURE — 99214 OFFICE O/P EST MOD 30 MIN: CPT | Mod: S$PBB,,, | Performed by: INTERNAL MEDICINE

## 2025-05-01 RX ORDER — PRASUGREL 5 MG/1
5 TABLET, FILM COATED ORAL
COMMUNITY
Start: 2025-04-28 | End: 2025-10-25

## 2025-05-01 NOTE — PATIENT INSTRUCTIONS
Schedule abdominal ultrasound.  Continue omeprazole 20 mg daily.   Notify my office if you begin having issues.     If any tests, procedures, or imaging has been ordered and you are not contacted to schedule within 1-2 weeks, then you may call the central scheduling department directly at [phone].   Please notify my office if you have not been contacted within two weeks after any procedures, submitting any samples (biopsies, blood, stool, urine, etc.) or after any imaging (X-ray, CT, MRI, etc.).

## 2025-05-01 NOTE — LETTER
May 1, 2025        Jose Sim MD  [address] [facility] [address]  [place] [address]             [facility]  [address] [place] [address]  Phone: [phone]  Fax: [phone]   Patient: Christiane Harry   MR Number: [id number]   YOB: 1955   Date of Visit: 5/1/2025       Dear Dr. Sim:    Thank you for referring Christiane Harry to me for evaluation. Attached you will find relevant portions of my assessment and plan of care.    If you have questions, please do not hesitate to call me. I look forward to following Christiane Harry along with you.    Sincerely,      Polina Stokes MD            CC  No Recipients    Enclosure

## 2025-05-01 NOTE — PROGRESS NOTES
Clinic Note    Reason for visit:  The primary encounter diagnosis was Gastroesophageal reflux disease, unspecified whether esophagitis present. Diagnoses of Gastric polyps, Hepatic cyst, History of colon polyps, and Long term (current) use of antithrombotics/antiplatelets were also pertinent to this visit.    PCP: Jose Sim       HPI:  This is a 70 y.o. female who was previously established with Dr. Waldron. She has h/o hepatic cysts. She takes omeprazole 20 mg for reflux which controls her symptoms. No abdominal pain. She has h/o gastric polyps and was getting surveillance EGD every 2 years.     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 In 2/2025 she had a FREDx Flow Diversion device in right internal carotid artery and placed on Effient and  at that time. This was done in [place]. She has been having frequent nosebleeds since being on Effient.     She has h/o liver cysts. Was seen by Dr. Zamora in 2017. Had MRI showing no suspicious features of liver cysts and he recommend repeat imaging in 6-12 months to ensure stability.     EGD 6/6/2023: Small HH, widely patent LE ring. Four sessile polyps in stomach body. Benign stomach polyps-repeat EGD in 2 yrs.     ABD US 5/2023: Simple liver cysts measuring up to 8 cm (report states renal, likely erroneously).   US 5/10/2022: U/S shows no change in cysts in liver-gallbladder ok, small stones in right kidney-take in plenty of fluids     Colonoscopy 5/3/2022: diverticulosis of sigmoid, long redundant colon. Repeat in 5 yr.     EGD 6/1/2021: benign gastric polyps-repeat EGD in 2 yrs  2017 IgA/TTG IgA nl    MRI Abd W WO 2017: multiple hepatic cysts, largest 5.3 cm w/o suspicious features. Simple cysts.     2017 AFP nl    2017 ABD US with multiple hepatic cysts, one in right lobe 6.7 cm with  nodularity to the wall making it a complicated cyst. Rec repeat imaging in 6 mon    Review of Systems   Constitutional:  Negative for fatigue, fever and unexpected weight change.   HENT:  Negative for mouth sores, postnasal drip, sore throat and trouble swallowing.    Eyes:  Negative for pain, discharge and eye dryness.   Respiratory:  Negative for apnea, cough, choking, chest tightness, shortness of breath and wheezing.    Cardiovascular:  Negative for chest pain, palpitations and leg swelling.   Gastrointestinal:  Negative for abdominal distention, abdominal pain, anal bleeding, blood in stool, change in bowel habit, constipation, diarrhea, nausea, rectal pain, vomiting, reflux and fecal incontinence.   Genitourinary:  Negative for bladder incontinence, dysuria and hematuria.   Musculoskeletal:  Negative for arthralgias, back pain and joint swelling.   Integumentary:  Negative for color change and rash.   Allergic/Immunologic: Negative for environmental allergies and food allergies.   Neurological:  Negative for seizures and headaches.   Hematological:  Negative for adenopathy. Does not bruise/bleed easily.        Past Medical History:   Diagnosis Date    Aneurysm of cavernous portion of right internal carotid artery 02/2025    s/p stent    Atrophic vaginitis     BMI 24.0-24.9, adult     Dyslipidemia     Environmental and seasonal allergies     Generalized anxiety disorder     History of breast cancer     left    History of colonic polyps     Hypothyroidism, unspecified     Osteopenia     Polyp of stomach      Past Surgical History:   Procedure Laterality Date    BREAST LUMPECTOMY Left     CATARACT EXTRACTION      COLONOSCOPY      ESOPHAGOGASTRODUODENOSCOPY  06/01/2021    repeat EGD due 6/1/2023    INTRACRANIAL ANEURYSM REPAIR N/A 02/2025    INTRAOCULAR PROSTHESES INSERTION      SURGICAL REMOVAL OF LYMPH NODE       Family History   Problem Relation Name Age of Onset    Ovarian cancer Mother      Colon cancer Neg  "Hx      Inflammatory bowel disease Neg Hx      Liver cancer Neg Hx      Stomach cancer Neg Hx      Rectal cancer Neg Hx      Liver disease Neg Hx      Pancreatic cancer Neg Hx      Throat cancer Neg Hx      Esophageal cancer Neg Hx      Ulcerative colitis Neg Hx      Crohn's disease Neg Hx       Social History[1]  Review of patient's allergies indicates:  No Known Allergies     Medication List with Changes/Refills   Current Medications    CALCIUM CARBONATE (OS-GUILLE) 600 MG CALCIUM (1,500 MG) TAB    Take 1 tablet by mouth 2 (two) times daily with meals.    CALCIUM-MINERALS-D3-K2-SILICON 200 MG CALCIUM- 200 UNIT TAB    Take 600 mg by mouth 2 (two) times daily.    COENZYME Q10 10 MG CAPSULE    Take 10 mg by mouth once daily.    ESCITALOPRAM OXALATE (LEXAPRO) 5 MG TAB    Take 5 mg by mouth once daily.    LEVOTHYROXINE (SYNTHROID) 25 MCG TABLET    Take 25 mcg by mouth once daily.    MULTIVIT-MIN-IRON-FA-VIT K-LUT 8 MG IRON-400 MCG-50 MCG TAB    Take by mouth.    OMEGA-3 FATTY ACIDS/FISH OIL (FISH OIL-OMEGA-3 FATTY ACIDS) 300-1,000 MG CAPSULE    Take 1 capsule by mouth once daily.    OMEPRAZOLE (PRILOSEC) 20 MG CAPSULE    Take 1 capsule (20 mg total) by mouth once daily.    PRASUGREL HCL (EFFIENT) 5 MG TAB    Take 5 mg by mouth.    ROSUVASTATIN (CRESTOR) 5 MG TABLET    Take 5 mg by mouth once daily.    TRAZODONE (DESYREL) 50 MG TABLET    Take 50 mg by mouth every evening.    VITAMIN K2 ORAL    Take 50 mcg by mouth.    ZINC 50 MG TAB    Take 50 mg by mouth once daily.   Discontinued Medications    ALOE VERA, BULK, POWD    Apply 2-4 clicks externally as needed    CEFDINIR (OMNICEF) 300 MG CAPSULE    Take 300 mg by mouth 2 (two) times daily.    HYDROCODONE-ACETAMINOPHEN (NORCO) 7.5-325 MG PER TABLET    Take 1 tablet by mouth every 8 (eight) hours.         Vital Signs:  BP (!) 145/82 (BP Location: Left arm, Patient Position: Sitting)   Pulse 64   Ht 5' 1.5" (1.562 m)   Wt 59.8 kg (131 lb 12.8 oz)   SpO2 97%   BMI 24.50 " kg/m²         Physical Exam  Vitals reviewed.   Constitutional:       General: She is awake. She is not in acute distress.     Appearance: Normal appearance. She is well-developed. She is not ill-appearing, toxic-appearing or diaphoretic.   HENT:      Head: Normocephalic and atraumatic.      Nose: Nose normal.      Mouth/Throat:      Mouth: Mucous membranes are moist.      Pharynx: Oropharynx is clear. No oropharyngeal exudate or posterior oropharyngeal erythema.   Eyes:      General: Lids are normal. Gaze aligned appropriately. No scleral icterus.        Right eye: No discharge.         Left eye: No discharge.      Conjunctiva/sclera: Conjunctivae normal.   Neck:      Trachea: Trachea normal.   Cardiovascular:      Rate and Rhythm: Normal rate and regular rhythm.      Pulses:           Radial pulses are 2+ on the right side and 2+ on the left side.   Pulmonary:      Effort: Pulmonary effort is normal. No respiratory distress.      Breath sounds: No stridor. No wheezing.   Chest:      Chest wall: No tenderness.   Abdominal:      General: Bowel sounds are normal. There is no distension.      Palpations: Abdomen is soft. There is no fluid wave, hepatomegaly or mass.      Tenderness: There is no abdominal tenderness. There is no guarding or rebound.   Musculoskeletal:         General: No tenderness or deformity.      Cervical back: No tenderness.      Right lower leg: No edema.      Left lower leg: No edema.   Lymphadenopathy:      Cervical: No cervical adenopathy.   Skin:     General: Skin is warm and dry.      Capillary Refill: Capillary refill takes less than 2 seconds.      Coloration: Skin is not cyanotic, jaundiced or pale.   Neurological:      General: No focal deficit present.      Mental Status: She is alert and oriented to person, place, and time.      Motor: No tremor.   Psychiatric:         Attention and Perception: Attention normal.         Mood and Affect: Mood and affect normal.         Speech: Speech  normal.         Behavior: Behavior normal. Behavior is cooperative.            All of the data above and below has been reviewed by myself and any further interpretations will be reflected in the assessment and plan.   The data includes review of external notes, and independent interpretation of lab results, procedures, x-rays, and imaging reports.      Assessment:  Gastroesophageal reflux disease, unspecified whether esophagitis present    Gastric polyps    Hepatic cyst  -     US Abdomen Limited; Future; Expected date: 05/01/2025    History of colon polyps    Long term (current) use of antithrombotics/antiplatelets    Colon due 2027  EGD due 6/2025. Gastric polyp surveillance? In 2/2025 she had a FREDx Flow Diversion device in R internal carotid artery. Now on Effient and . Would avoid non-emergent EGD given recent stent and on blood thinners.   No GI issues currently.   GERD controlled on ome 20 daily.     Recommendations:    Schedule abdominal ultrasound.  Continue omeprazole 20 mg daily.   Notify my office if you begin having issues.     If any tests, procedures, or imaging has been ordered and you are not contacted to schedule within 1-2 weeks, then you may call the central scheduling department directly at [phone].     Risks, benefits, and alternatives of medical management, any associated procedures, and/or treatment discussed with the patient. Patient given opportunity to ask questions and voices understanding. Patient has elected to proceed with the recommended care modalities as discussed.    Instructed patient to notify my office if they have not been contacted within two weeks after any procedures, submitting any samples (biopsies, blood, stool, urine, etc.) or after any imaging (X-ray, CT, MRI, etc.).     Follow up in about 6 months (around 11/1/2025).    Order summary:  Orders Placed This Encounter   Procedures    US Abdomen Limited      This assessment, plan, and documentation was  performed in collaboration with Jennifer Masterson NP.     This document may have been created using a voice recognition transcribing system. Incorrect words or phrases may have been missed during proofreading. Please interpret accordingly or contact me for clarification.     Polina Stokes MD         [1]   Social History  Tobacco Use    Smoking status: Never    Smokeless tobacco: Never   Substance Use Topics    Alcohol use: Yes     Alcohol/week: 1.0 standard drink of alcohol     Types: 1 Glasses of wine per week     Comment: occassionally    Drug use: Never

## 2025-05-01 NOTE — TELEPHONE ENCOUNTER
Patients us order was faxed to central scheduling. Patient was given AVS with apt details. 5/1/25 LRA

## 2025-05-18 ENCOUNTER — RESULTS FOLLOW-UP (OUTPATIENT)
Dept: GASTROENTEROLOGY | Facility: CLINIC | Age: 70
End: 2025-05-18
Payer: MEDICARE

## 2025-05-18 NOTE — TELEPHONE ENCOUNTER
5/13/2025 RUQ US: multiple liver cysts (up to 9.3cm in right, 2.8cm left), GB nl, no bobby dil, PV patent.    Notify patient that her US shows her known liver cysts. The largest measured 9.3cm.  We will continue to monitor her cyst with US. Repeat in one year. Keep follow up OV with us and notify us sooner if any issues.  NBP

## 2025-05-20 NOTE — TELEPHONE ENCOUNTER
Patient notified of NBP's remarks and voiced understanding. She will keep her follow up OV with us. DMP